# Patient Record
Sex: FEMALE | Race: BLACK OR AFRICAN AMERICAN | Employment: FULL TIME | URBAN - METROPOLITAN AREA
[De-identification: names, ages, dates, MRNs, and addresses within clinical notes are randomized per-mention and may not be internally consistent; named-entity substitution may affect disease eponyms.]

---

## 2017-01-11 PROBLEM — G47.00 INSOMNIA: Status: ACTIVE | Noted: 2017-01-11

## 2017-01-23 ENCOUNTER — HOSPITAL ENCOUNTER (OUTPATIENT)
Dept: LAB | Age: 57
Discharge: HOME | End: 2017-01-23

## 2017-01-23 PROCEDURE — 88305 TISSUE EXAM BY PATHOLOGIST: CPT | Performed by: INTERNAL MEDICINE

## 2017-08-10 ENCOUNTER — HOSPITAL ENCOUNTER (OUTPATIENT)
Dept: MAMMOGRAPHY | Age: 57
Discharge: HOME | End: 2017-08-10
Attending: NURSE PRACTITIONER
Payer: COMMERCIAL

## 2017-08-10 DIAGNOSIS — Z12.31 ENCOUNTER FOR MAMMOGRAM TO ESTABLISH BASELINE MAMMOGRAM: ICD-10-CM

## 2017-08-10 PROCEDURE — 77067 SCR MAMMO BI INCL CAD: CPT

## 2017-08-15 PROBLEM — F41.1 GENERALIZED ANXIETY DISORDER: Status: ACTIVE | Noted: 2017-08-15

## 2017-08-15 PROBLEM — F33.9 RECURRENT MAJOR DEPRESSIVE DISORDER (HCC): Status: ACTIVE | Noted: 2017-08-15

## 2017-08-29 ENCOUNTER — HOSPITAL ENCOUNTER (OUTPATIENT)
Dept: MAMMOGRAPHY | Age: 57
Discharge: HOME | End: 2017-08-29
Attending: NURSE PRACTITIONER
Payer: COMMERCIAL

## 2017-08-29 DIAGNOSIS — R92.8 ABNORMAL SCREENING MAMMOGRAM: ICD-10-CM

## 2017-08-29 PROCEDURE — 76642 ULTRASOUND BREAST LIMITED: CPT

## 2017-08-29 PROCEDURE — 77065 DX MAMMO INCL CAD UNI: CPT

## 2018-01-01 ENCOUNTER — HOSPITAL ENCOUNTER (INPATIENT)
Age: 58
LOS: 2 days | Discharge: HOME | DRG: 246 | End: 2018-01-03
Attending: EMERGENCY MEDICINE | Admitting: INTERNAL MEDICINE
Payer: COMMERCIAL

## 2018-01-01 DIAGNOSIS — I21.3 ACUTE ST ELEVATION MYOCARDIAL INFARCTION (STEMI), UNSPECIFIED ARTERY (HCC): Primary | ICD-10-CM

## 2018-01-01 PROBLEM — I21.19 ST ELEVATION MYOCARDIAL INFARCTION (STEMI) OF INFERIOR WALL (HCC): Status: ACTIVE | Noted: 2018-01-01

## 2018-01-01 PROBLEM — Z72.0 TOBACCO USE: Status: ACTIVE | Noted: 2018-01-01

## 2018-01-01 PROBLEM — I49.01 VENTRICULAR FIBRILLATION (HCC): Status: ACTIVE | Noted: 2018-01-01

## 2018-01-01 LAB
ACT BLD: 379 SECS (ref 70–128)
ALBUMIN SERPL-MCNC: 3.8 G/DL (ref 3.5–5)
ALBUMIN/GLOB SERPL: 0.8 {RATIO} (ref 1.2–3.5)
ALP SERPL-CCNC: 96 U/L (ref 50–136)
ALT SERPL-CCNC: 32 U/L (ref 12–65)
ANION GAP SERPL CALC-SCNC: 7 MMOL/L (ref 7–16)
AST SERPL-CCNC: 41 U/L (ref 15–37)
ATRIAL RATE: 62 BPM
ATRIAL RATE: 65 BPM
ATRIAL RATE: 77 BPM
BASOPHILS # BLD: 0 K/UL (ref 0–0.2)
BASOPHILS # BLD: 0.1 K/UL (ref 0–0.2)
BASOPHILS NFR BLD: 0 % (ref 0–2)
BASOPHILS NFR BLD: 1 % (ref 0–2)
BILIRUB SERPL-MCNC: 0.4 MG/DL (ref 0.2–1.1)
BUN SERPL-MCNC: 13 MG/DL (ref 6–23)
CALCIUM SERPL-MCNC: 8.9 MG/DL (ref 8.3–10.4)
CALCULATED P AXIS, ECG09: 51 DEGREES
CALCULATED P AXIS, ECG09: 52 DEGREES
CALCULATED P AXIS, ECG09: 59 DEGREES
CALCULATED R AXIS, ECG10: 0 DEGREES
CALCULATED R AXIS, ECG10: 37 DEGREES
CALCULATED R AXIS, ECG10: 68 DEGREES
CALCULATED T AXIS, ECG11: -6 DEGREES
CALCULATED T AXIS, ECG11: 110 DEGREES
CALCULATED T AXIS, ECG11: 52 DEGREES
CHLORIDE SERPL-SCNC: 106 MMOL/L (ref 98–107)
CO2 SERPL-SCNC: 29 MMOL/L (ref 21–32)
CREAT SERPL-MCNC: 0.86 MG/DL (ref 0.6–1)
DIAGNOSIS, 93000: NORMAL
DIFFERENTIAL METHOD BLD: ABNORMAL
DIFFERENTIAL METHOD BLD: ABNORMAL
EOSINOPHIL # BLD: 0 K/UL (ref 0–0.8)
EOSINOPHIL # BLD: 0.1 K/UL (ref 0–0.8)
EOSINOPHIL NFR BLD: 0 % (ref 0.5–7.8)
EOSINOPHIL NFR BLD: 1 % (ref 0.5–7.8)
ERYTHROCYTE [DISTWIDTH] IN BLOOD BY AUTOMATED COUNT: 14.2 % (ref 11.9–14.6)
ERYTHROCYTE [DISTWIDTH] IN BLOOD BY AUTOMATED COUNT: 14.4 % (ref 11.9–14.6)
ERYTHROCYTE [DISTWIDTH] IN BLOOD BY AUTOMATED COUNT: 14.5 % (ref 11.9–14.6)
GLOBULIN SER CALC-MCNC: 5 G/DL (ref 2.3–3.5)
GLUCOSE SERPL-MCNC: 151 MG/DL (ref 65–100)
HCT VFR BLD AUTO: 34.4 % (ref 35.8–46.3)
HCT VFR BLD AUTO: 36.7 % (ref 35.8–46.3)
HCT VFR BLD AUTO: 42 % (ref 35.8–46.3)
HGB BLD-MCNC: 11 G/DL (ref 11.7–15.4)
HGB BLD-MCNC: 11.9 G/DL (ref 11.7–15.4)
HGB BLD-MCNC: 13.7 G/DL (ref 11.7–15.4)
IMM GRANULOCYTES # BLD: 0 K/UL (ref 0–0.5)
IMM GRANULOCYTES # BLD: 0 K/UL (ref 0–0.5)
IMM GRANULOCYTES NFR BLD AUTO: 0 % (ref 0–5)
IMM GRANULOCYTES NFR BLD AUTO: 0 % (ref 0–5)
LYMPHOCYTES # BLD: 2.7 K/UL (ref 0.5–4.6)
LYMPHOCYTES # BLD: 3.4 K/UL (ref 0.5–4.6)
LYMPHOCYTES NFR BLD: 32 % (ref 13–44)
LYMPHOCYTES NFR BLD: 44 % (ref 13–44)
MCH RBC QN AUTO: 27.6 PG (ref 26.1–32.9)
MCH RBC QN AUTO: 27.6 PG (ref 26.1–32.9)
MCH RBC QN AUTO: 28.1 PG (ref 26.1–32.9)
MCHC RBC AUTO-ENTMCNC: 32 G/DL (ref 31.4–35)
MCHC RBC AUTO-ENTMCNC: 32.4 G/DL (ref 31.4–35)
MCHC RBC AUTO-ENTMCNC: 32.6 G/DL (ref 31.4–35)
MCV RBC AUTO: 85.2 FL (ref 79.6–97.8)
MCV RBC AUTO: 86.2 FL (ref 79.6–97.8)
MCV RBC AUTO: 86.2 FL (ref 79.6–97.8)
MONOCYTES # BLD: 0.6 K/UL (ref 0.1–1.3)
MONOCYTES # BLD: 0.8 K/UL (ref 0.1–1.3)
MONOCYTES NFR BLD: 7 % (ref 4–12)
MONOCYTES NFR BLD: 9 % (ref 4–12)
NEUTS SEG # BLD: 3.7 K/UL (ref 1.7–8.2)
NEUTS SEG # BLD: 4.9 K/UL (ref 1.7–8.2)
NEUTS SEG NFR BLD: 47 % (ref 43–78)
NEUTS SEG NFR BLD: 59 % (ref 43–78)
P-R INTERVAL, ECG05: 144 MS
P-R INTERVAL, ECG05: 154 MS
P-R INTERVAL, ECG05: 176 MS
PLATELET # BLD AUTO: 297 K/UL (ref 150–450)
PLATELET # BLD AUTO: 311 K/UL (ref 150–450)
PLATELET # BLD AUTO: 357 K/UL (ref 150–450)
PMV BLD AUTO: 10.5 FL (ref 10.8–14.1)
PMV BLD AUTO: 9.6 FL (ref 10.8–14.1)
PMV BLD AUTO: 9.7 FL (ref 10.8–14.1)
POTASSIUM SERPL-SCNC: 4.4 MMOL/L (ref 3.5–5.1)
PROT SERPL-MCNC: 8.8 G/DL (ref 6.3–8.2)
Q-T INTERVAL, ECG07: 408 MS
Q-T INTERVAL, ECG07: 418 MS
Q-T INTERVAL, ECG07: 450 MS
QRS DURATION, ECG06: 82 MS
QRS DURATION, ECG06: 86 MS
QRS DURATION, ECG06: 90 MS
QTC CALCULATION (BEZET), ECG08: 434 MS
QTC CALCULATION (BEZET), ECG08: 456 MS
QTC CALCULATION (BEZET), ECG08: 461 MS
RBC # BLD AUTO: 3.99 M/UL (ref 4.05–5.25)
RBC # BLD AUTO: 4.31 M/UL (ref 4.05–5.25)
RBC # BLD AUTO: 4.87 M/UL (ref 4.05–5.25)
SODIUM SERPL-SCNC: 142 MMOL/L (ref 136–145)
TROPONIN I BLD-MCNC: 0 NG/ML (ref 0.02–0.05)
TROPONIN I SERPL-MCNC: 0.02 NG/ML (ref 0.02–0.05)
VENTRICULAR RATE, ECG03: 62 BPM
VENTRICULAR RATE, ECG03: 65 BPM
VENTRICULAR RATE, ECG03: 77 BPM
WBC # BLD AUTO: 7.3 K/UL (ref 4.3–11.1)
WBC # BLD AUTO: 7.8 K/UL (ref 4.3–11.1)
WBC # BLD AUTO: 8.5 K/UL (ref 4.3–11.1)

## 2018-01-01 PROCEDURE — 85025 COMPLETE CBC W/AUTO DIFF WBC: CPT | Performed by: EMERGENCY MEDICINE

## 2018-01-01 PROCEDURE — 74011250637 HC RX REV CODE- 250/637: Performed by: PHYSICIAN ASSISTANT

## 2018-01-01 PROCEDURE — C1874 STENT, COATED/COV W/DEL SYS: HCPCS

## 2018-01-01 PROCEDURE — C1769 GUIDE WIRE: HCPCS

## 2018-01-01 PROCEDURE — 74011250636 HC RX REV CODE- 250/636: Performed by: PHYSICIAN ASSISTANT

## 2018-01-01 PROCEDURE — 5A2204Z RESTORATION OF CARDIAC RHYTHM, SINGLE: ICD-10-PCS | Performed by: INTERNAL MEDICINE

## 2018-01-01 PROCEDURE — B2151ZZ FLUOROSCOPY OF LEFT HEART USING LOW OSMOLAR CONTRAST: ICD-10-PCS | Performed by: INTERNAL MEDICINE

## 2018-01-01 PROCEDURE — 85347 COAGULATION TIME ACTIVATED: CPT

## 2018-01-01 PROCEDURE — 93005 ELECTROCARDIOGRAM TRACING: CPT | Performed by: EMERGENCY MEDICINE

## 2018-01-01 PROCEDURE — 84484 ASSAY OF TROPONIN QUANT: CPT | Performed by: EMERGENCY MEDICINE

## 2018-01-01 PROCEDURE — 85027 COMPLETE CBC AUTOMATED: CPT | Performed by: PHYSICIAN ASSISTANT

## 2018-01-01 PROCEDURE — 027034Z DILATION OF CORONARY ARTERY, ONE ARTERY WITH DRUG-ELUTING INTRALUMINAL DEVICE, PERCUTANEOUS APPROACH: ICD-10-PCS | Performed by: INTERNAL MEDICINE

## 2018-01-01 PROCEDURE — C1887 CATHETER, GUIDING: HCPCS

## 2018-01-01 PROCEDURE — 93005 ELECTROCARDIOGRAM TRACING: CPT | Performed by: INTERNAL MEDICINE

## 2018-01-01 PROCEDURE — 74011250636 HC RX REV CODE- 250/636: Performed by: INTERNAL MEDICINE

## 2018-01-01 PROCEDURE — B2111ZZ FLUOROSCOPY OF MULTIPLE CORONARY ARTERIES USING LOW OSMOLAR CONTRAST: ICD-10-PCS | Performed by: INTERNAL MEDICINE

## 2018-01-01 PROCEDURE — 36415 COLL VENOUS BLD VENIPUNCTURE: CPT | Performed by: PHYSICIAN ASSISTANT

## 2018-01-01 PROCEDURE — 77030019569 HC BND COMPR RAD TERU -B

## 2018-01-01 PROCEDURE — 74011636320 HC RX REV CODE- 636/320: Performed by: INTERNAL MEDICINE

## 2018-01-01 PROCEDURE — 74011250636 HC RX REV CODE- 250/636

## 2018-01-01 PROCEDURE — 80053 COMPREHEN METABOLIC PANEL: CPT | Performed by: EMERGENCY MEDICINE

## 2018-01-01 PROCEDURE — 92941 PRQ TRLML REVSC TOT OCCL AMI: CPT

## 2018-01-01 PROCEDURE — 74011000250 HC RX REV CODE- 250: Performed by: INTERNAL MEDICINE

## 2018-01-01 PROCEDURE — 93458 L HRT ARTERY/VENTRICLE ANGIO: CPT

## 2018-01-01 PROCEDURE — C1894 INTRO/SHEATH, NON-LASER: HCPCS

## 2018-01-01 PROCEDURE — 99285 EMERGENCY DEPT VISIT HI MDM: CPT | Performed by: EMERGENCY MEDICINE

## 2018-01-01 PROCEDURE — 99152 MOD SED SAME PHYS/QHP 5/>YRS: CPT

## 2018-01-01 PROCEDURE — 74011250637 HC RX REV CODE- 250/637: Performed by: INTERNAL MEDICINE

## 2018-01-01 PROCEDURE — 4A023N7 MEASUREMENT OF CARDIAC SAMPLING AND PRESSURE, LEFT HEART, PERCUTANEOUS APPROACH: ICD-10-PCS | Performed by: INTERNAL MEDICINE

## 2018-01-01 PROCEDURE — C1725 CATH, TRANSLUMIN NON-LASER: HCPCS

## 2018-01-01 PROCEDURE — 77030004534 HC CATH ANGI DX INFN CARD -A

## 2018-01-01 PROCEDURE — 99153 MOD SED SAME PHYS/QHP EA: CPT

## 2018-01-01 PROCEDURE — 65610000001 HC ROOM ICU GENERAL

## 2018-01-01 RX ORDER — EPTIFIBATIDE 2 MG/ML
180 INJECTION, SOLUTION INTRAVENOUS
Status: DISCONTINUED | OUTPATIENT
Start: 2018-01-01 | End: 2018-01-01

## 2018-01-01 RX ORDER — SODIUM CHLORIDE 0.9 % (FLUSH) 0.9 %
5-10 SYRINGE (ML) INJECTION AS NEEDED
Status: DISCONTINUED | OUTPATIENT
Start: 2018-01-01 | End: 2018-01-03 | Stop reason: HOSPADM

## 2018-01-01 RX ORDER — MORPHINE SULFATE 2 MG/ML
2 INJECTION, SOLUTION INTRAMUSCULAR; INTRAVENOUS ONCE
Status: COMPLETED | OUTPATIENT
Start: 2018-01-01 | End: 2018-01-01

## 2018-01-01 RX ORDER — HEPARIN SODIUM 200 [USP'U]/100ML
3 INJECTION, SOLUTION INTRAVENOUS CONTINUOUS
Status: DISCONTINUED | OUTPATIENT
Start: 2018-01-01 | End: 2018-01-01

## 2018-01-01 RX ORDER — MIDAZOLAM HYDROCHLORIDE 1 MG/ML
.5-5 INJECTION, SOLUTION INTRAMUSCULAR; INTRAVENOUS
Status: DISCONTINUED | OUTPATIENT
Start: 2018-01-01 | End: 2018-01-01

## 2018-01-01 RX ORDER — GUAIFENESIN 100 MG/5ML
81 LIQUID (ML) ORAL DAILY
Status: DISCONTINUED | OUTPATIENT
Start: 2018-01-01 | End: 2018-01-03 | Stop reason: HOSPADM

## 2018-01-01 RX ORDER — SODIUM CHLORIDE 0.9 % (FLUSH) 0.9 %
5-10 SYRINGE (ML) INJECTION EVERY 8 HOURS
Status: DISCONTINUED | OUTPATIENT
Start: 2018-01-01 | End: 2018-01-03 | Stop reason: HOSPADM

## 2018-01-01 RX ORDER — NITROGLYCERIN 0.4 MG/1
0.4 TABLET SUBLINGUAL
Status: DISCONTINUED | OUTPATIENT
Start: 2018-01-01 | End: 2018-01-01 | Stop reason: SDUPTHER

## 2018-01-01 RX ORDER — TRAZODONE HYDROCHLORIDE 50 MG/1
100 TABLET ORAL
Status: DISCONTINUED | OUTPATIENT
Start: 2018-01-01 | End: 2018-01-03 | Stop reason: HOSPADM

## 2018-01-01 RX ORDER — ATROPINE SULFATE 0.1 MG/ML
1 INJECTION INTRAVENOUS ONCE
Status: COMPLETED | OUTPATIENT
Start: 2018-01-01 | End: 2018-01-01

## 2018-01-01 RX ORDER — ACETAMINOPHEN 325 MG/1
650 TABLET ORAL
Status: DISCONTINUED | OUTPATIENT
Start: 2018-01-01 | End: 2018-01-03 | Stop reason: HOSPADM

## 2018-01-01 RX ORDER — ATORVASTATIN CALCIUM 40 MG/1
80 TABLET, FILM COATED ORAL
Status: DISCONTINUED | OUTPATIENT
Start: 2018-01-01 | End: 2018-01-03 | Stop reason: HOSPADM

## 2018-01-01 RX ORDER — ASPIRIN 81 MG/1
81 TABLET ORAL DAILY
Status: DISCONTINUED | OUTPATIENT
Start: 2018-01-01 | End: 2018-01-01 | Stop reason: SDUPTHER

## 2018-01-01 RX ORDER — EPTIFIBATIDE 0.75 MG/ML
2 INJECTION, SOLUTION INTRAVENOUS CONTINUOUS
Status: DISCONTINUED | OUTPATIENT
Start: 2018-01-01 | End: 2018-01-01

## 2018-01-01 RX ORDER — LIDOCAINE HYDROCHLORIDE 20 MG/ML
1-20 INJECTION, SOLUTION INFILTRATION; PERINEURAL
Status: DISCONTINUED | OUTPATIENT
Start: 2018-01-01 | End: 2018-01-01

## 2018-01-01 RX ORDER — METOPROLOL TARTRATE 25 MG/1
25 TABLET, FILM COATED ORAL EVERY 12 HOURS
Status: DISCONTINUED | OUTPATIENT
Start: 2018-01-01 | End: 2018-01-03

## 2018-01-01 RX ORDER — SERTRALINE HYDROCHLORIDE 100 MG/1
100 TABLET, FILM COATED ORAL 2 TIMES DAILY
Status: DISCONTINUED | OUTPATIENT
Start: 2018-01-01 | End: 2018-01-03 | Stop reason: HOSPADM

## 2018-01-01 RX ORDER — SODIUM CHLORIDE 9 MG/ML
75 INJECTION, SOLUTION INTRAVENOUS CONTINUOUS
Status: DISCONTINUED | OUTPATIENT
Start: 2018-01-01 | End: 2018-01-02

## 2018-01-01 RX ORDER — NITROGLYCERIN 0.4 MG/1
0.4 TABLET SUBLINGUAL
Status: DISCONTINUED | OUTPATIENT
Start: 2018-01-01 | End: 2018-01-03 | Stop reason: HOSPADM

## 2018-01-01 RX ORDER — EPTIFIBATIDE 0.75 MG/ML
2 INJECTION, SOLUTION INTRAVENOUS CONTINUOUS
Status: DISPENSED | OUTPATIENT
Start: 2018-01-01 | End: 2018-01-01

## 2018-01-01 RX ORDER — FENTANYL CITRATE 50 UG/ML
25-100 INJECTION, SOLUTION INTRAMUSCULAR; INTRAVENOUS
Status: DISCONTINUED | OUTPATIENT
Start: 2018-01-01 | End: 2018-01-01

## 2018-01-01 RX ORDER — HEPARIN SODIUM 10000 [USP'U]/ML
40-80 INJECTION, SOLUTION INTRAVENOUS; SUBCUTANEOUS
Status: DISCONTINUED | OUTPATIENT
Start: 2018-01-01 | End: 2018-01-01

## 2018-01-01 RX ORDER — CLOPIDOGREL BISULFATE 75 MG/1
75 TABLET ORAL DAILY
Status: DISCONTINUED | OUTPATIENT
Start: 2018-01-02 | End: 2018-01-01 | Stop reason: ALTCHOICE

## 2018-01-01 RX ADMIN — Medication 10 ML: at 22:05

## 2018-01-01 RX ADMIN — TICAGRELOR 180 MG: 90 TABLET ORAL at 09:43

## 2018-01-01 RX ADMIN — MORPHINE SULFATE 2 MG: 2 INJECTION, SOLUTION INTRAMUSCULAR; INTRAVENOUS at 14:45

## 2018-01-01 RX ADMIN — METOPROLOL TARTRATE 25 MG: 25 TABLET ORAL at 22:04

## 2018-01-01 RX ADMIN — SERTRALINE HYDROCHLORIDE 100 MG: 100 TABLET ORAL at 12:45

## 2018-01-01 RX ADMIN — EPTIFIBATIDE 2 MCG/KG/MIN: 0.75 INJECTION, SOLUTION INTRAVENOUS at 09:38

## 2018-01-01 RX ADMIN — Medication 10 ML: at 13:13

## 2018-01-01 RX ADMIN — ASPIRIN 81 MG 81 MG: 81 TABLET ORAL at 11:18

## 2018-01-01 RX ADMIN — TICAGRELOR 90 MG: 90 TABLET ORAL at 22:04

## 2018-01-01 RX ADMIN — SERTRALINE HYDROCHLORIDE 100 MG: 100 TABLET ORAL at 17:04

## 2018-01-01 RX ADMIN — EPTIFIBATIDE 2 MCG/KG/MIN: 0.75 INJECTION, SOLUTION INTRAVENOUS at 12:28

## 2018-01-01 RX ADMIN — ATROPINE SULFATE 0.5 MG: 0.1 INJECTION, SOLUTION INTRAVENOUS at 09:27

## 2018-01-01 RX ADMIN — MORPHINE SULFATE 2 MG: 2 INJECTION, SOLUTION INTRAMUSCULAR; INTRAVENOUS at 17:04

## 2018-01-01 RX ADMIN — SODIUM CHLORIDE 75 ML/HR: 900 INJECTION, SOLUTION INTRAVENOUS at 11:04

## 2018-01-01 RX ADMIN — TRAZODONE HYDROCHLORIDE 100 MG: 50 TABLET ORAL at 22:04

## 2018-01-01 RX ADMIN — METOPROLOL TARTRATE 25 MG: 25 TABLET ORAL at 11:18

## 2018-01-01 RX ADMIN — NITROGLYCERIN 1 INCH: 20 OINTMENT TOPICAL at 15:30

## 2018-01-01 RX ADMIN — ACETAMINOPHEN 650 MG: 325 TABLET ORAL at 20:28

## 2018-01-01 RX ADMIN — EPTIFIBATIDE 2 MCG/KG/MIN: 0.75 INJECTION, SOLUTION INTRAVENOUS at 09:28

## 2018-01-01 RX ADMIN — IOPAMIDOL 85 ML: 755 INJECTION, SOLUTION INTRAVENOUS at 09:49

## 2018-01-01 RX ADMIN — LIDOCAINE HYDROCHLORIDE 60 MG: 20 INJECTION, SOLUTION INFILTRATION; PERINEURAL at 09:15

## 2018-01-01 RX ADMIN — HEPARIN SODIUM 3 ML/HR: 200 INJECTION, SOLUTION INTRAVENOUS at 09:49

## 2018-01-01 RX ADMIN — FENTANYL CITRATE 25 MCG: 50 INJECTION, SOLUTION INTRAMUSCULAR; INTRAVENOUS at 09:16

## 2018-01-01 RX ADMIN — SODIUM CHLORIDE 75 ML/HR: 900 INJECTION, SOLUTION INTRAVENOUS at 22:04

## 2018-01-01 RX ADMIN — HEPARIN SODIUM 4000 UNITS: 10000 INJECTION, SOLUTION INTRAVENOUS; SUBCUTANEOUS at 09:21

## 2018-01-01 RX ADMIN — NITROGLYCERIN 0.4 MG: 0.4 TABLET SUBLINGUAL at 14:30

## 2018-01-01 RX ADMIN — EPTIFIBATIDE 2 MCG/KG/MIN: 0.75 INJECTION, SOLUTION INTRAVENOUS at 09:30

## 2018-01-01 RX ADMIN — EPTIFIBATIDE 2 MCG/KG/MIN: 0.75 INJECTION, SOLUTION INTRAVENOUS at 19:17

## 2018-01-01 RX ADMIN — HEPARIN SODIUM 2 ML: 10000 INJECTION, SOLUTION INTRAVENOUS; SUBCUTANEOUS at 09:15

## 2018-01-01 RX ADMIN — MIDAZOLAM HYDROCHLORIDE 2 MG: 1 INJECTION, SOLUTION INTRAMUSCULAR; INTRAVENOUS at 09:16

## 2018-01-01 RX ADMIN — ATORVASTATIN CALCIUM 80 MG: 40 TABLET, FILM COATED ORAL at 22:04

## 2018-01-01 NOTE — PROGRESS NOTES
Patient complains of chest pain 10/10 similar to pain prior. 1 palak tab given pain now 5/10. MD Perera notified. Orders received to give 2 mg Morphine IV, 1 in nitro paste, stat EKG.  Will monitor

## 2018-01-01 NOTE — PROGRESS NOTES
TRANSFER - IN REPORT:    Verbal report received from [place], [place] on [facility]  being received from Cath lab for routine progression of care      Report consisted of patients Situation, Background, Assessment and   Recommendations(SBAR). Information from the following report(s) SBAR, Procedure Summary, MAR, Med Rec Status and Cardiac Rhythm SR was reviewed with the receiving nurse. Opportunity for questions and clarification was provided. Assessment completed upon patients arrival to unit and care assumed. Dual skin assessment completed with Al Stewart RN findings were Skin color, texture, turgor normal. No rashes or lesions. R radial TR band in place. No bruising or hematoma, site CDI. Pt AAO x 4 on 2 lpm NC. NSR in 80-90's, neg CP at this time. Integalin gtt at 2 mcg/kg/min infusing.

## 2018-01-01 NOTE — PROGRESS NOTES
TRANSFER - OUT REPORT:    Verbal report given to Danitza Barron RN(name) on [facility]  being transferred to ICU(unit) for routine progression of care       Report consisted of patients Situation, Background, Assessment and   Recommendations(SBAR). Information from the following report(s) SBAR was reviewed with the receiving nurse. is allergic to bactrim [sulfamethoprim ds]; codeine; and sulfa (sulfonamide antibiotics). Opportunity for questions and clarification was provided. Procedure Summary:Pt had LHC for stemi, 1 stent placed in RCA via R wrist. Site sealed with R band using 12 ml at 0938 hrs. Med Administration    Versed:  2 mg  Fentanyl: 25 mcg  Heparin: 4000 units  Integrilin: bolus x2 and gtt.     Visit Vitals    /81    Pulse 97    Temp 97 °F (36.1 °C)    Resp 18    Ht 5' 4\" (1.626 m)    Wt 91.2 kg (201 lb)    SpO2 99%    BMI 34.5 kg/m2     Past Medical History:   Diagnosis Date    Atherosclerosis of native coronary artery of native heart without angina pectoris 4/21/2016    Depression     GERD (gastroesophageal reflux disease)     Hypercholesterolemia     Hypertension     Sleep apnea            Peripheral IV 01/01/18 Right Antecubital (Active)   Site Assessment Clean, dry, & intact 1/1/2018  9:01 AM   Phlebitis Assessment 0 1/1/2018  9:01 AM   Infiltration Assessment 0 1/1/2018  9:01 AM   Dressing Status Clean, dry, & intact 1/1/2018  9:01 AM

## 2018-01-01 NOTE — PROGRESS NOTES
CTSP bc of recurrent CP, under L breast, non radiating, without SOB, nausea or diaphoresis, 10/10- improved to 5/10 after nitro, then 2/10 with morphine 2 mg. EKG w diffuse t wave inversion but no ST elevation (PCI to RCA). Patient Vitals for the past 12 hrs:   Temp Pulse Resp BP SpO2   01/01/18 1430 - 66 - 105/71 -   01/01/18 1134 - 75 13 116/78 97 %   01/01/18 1118 - 72 27 123/69 98 %   01/01/18 1048 - 90 (!) 39 128/76 98 %   01/01/18 1026 97.4 °F (36.3 °C) 88 13 118/69 97 %   01/01/18 1006 - 97 18 123/81 -   01/01/18 0955 - 95 16 110/77 99 %   01/01/18 0857 - 66 12 - 99 %   01/01/18 0856 - 63 18 - 99 %   01/01/18 0853 - 64 14 - 99 %   01/01/18 0852 - 65 15 - 99 %   01/01/18 0851 - 65 12 - 99 %   01/01/18 0847 97 °F (36.1 °C) 71 12 135/72 98 %   01/01/18 0846 - - - 135/72 -   GEN: No distress  CV- RRR, no ectopy  Pul- CTA, chest wall not tender with palpation   Abdomen - NT  Musc- no edema  Integ- new petechial patch R clavicular about 10cm x 8 cm with two purpura in R chest- not tender, not blanchable    A/P 1.  STEMI- s/p PCI RCA, recurrent CP-EKG without acute ischemic changes, ekg w diffuse t wave inversion but no ST elevation (inferior st elevation resolved)- cont ASA, brilinta, Integrilin, statin and lopressor, no ACE/ARB due to hypotension. Will give additional morphine 2 mg and monitor.    2. Petechial rash- check stat CBC    IZZY Mcfarland

## 2018-01-01 NOTE — PROCEDURES
[address]    [facility]  MR#: [id number]  : 1960  ACCOUNT #: [de-identified]   DATE OF SERVICE: 2018    PROCEDURES:  Left heart catheterization, selective coronary arteriography, left ventriculogram via the right radial approach. INDICATIONS:  Ongoing chest discomfort with inferior ST elevation myocardial infarction, STEMI protocol activated. TECHNICAL FACTORS:  After informed consent was obtained, the patient was brought to the cardiac catheterization lab. Right radial artery was prepped and draped in usual sterile fashion. Utilizing modified Seldinger technique and micropuncture needle, the right radial artery was entered. A 6-Citizen of [place] Terumo slender sheath was placed without difficulty. A radial cocktail consisting of 2000 units of heparin, 2 mg of verapamil and 200 mcg of nitroglycerin was administered. A 5-Citizen of [place] Tiger 4.0 catheter was used to selectively engage the ostium of the right coronary artery and left main coronary artery respectively. Selective injection in various projections was performed. At this point, the patient underwent PCI of the RCA. Please see PCI note as below. Following PCI, a pigtail catheter was used to cross the aortic valve and then the left ventricle. Hemodynamic measurements and left ventriculogram were obtained. Left ventricular aortic pressure gradient was obtained by pullback technique. CONTRAST:  Isovue 85. SEDATION:  The patient received 2 mg of Versed and 25 mcg of fentanyl for moderate supervised conscious sedation. Sedation was administered by Ray Chapin RN, under my supervision. SEDATION START TIME:  9:17      PROCEDURE COMPLETION TIME:  9:45      HEMODYNAMICS:  1. The aortic pressure was 107/75 with a mean of 85.  2.  Left ventricular end diastolic pressure was 23.  3.  There was no significant gradient across the aortic valve. ANGIOGRAPHIC RESULTS:    1.   Left main coronary artery is a large-caliber vessel, angiographically-normal.  2.  LAD is a medium-caliber vessel, 20% proximal and 20% mid stenosis. 3.  First diagonal Artery is a medium-caliber vessel, 20% to 30% luminal irregularities proximally. 4.  Left circumflex is a medium-caliber, codominant vessel, contains 20% proximal and 30% mid stenosis. 5.  First obtuse marginal artery is a medium-caliber vessel, appeared patent. 6.  Second obtuse marginal artery is a medium-caliber vessel, appeared patent. 7.  Right coronary artery is a medium-caliber, codominant vessel, 30% proximal stenosis, 100% mid occlusion. LEFT VENTRICULOGRAM:  Performed in the ALBRIGHT projection shows EF is 65% with hyperdynamic function of the anterior wall and apex. There is mid inferior akinesis. Aortic root is not dilated. No mitral regurgitation is seen. CONCLUSIONS:    1.  Obstructive 1-vessel coronary artery disease involving the mid RCA. This is the culprit of her active myocardial infarction. 2.  Normal LV systolic function with inferior wall motion abnormalities consistent with ischemic cardiomyopathy. PLAN:  Proceed with PCI of the RCA. PCI NOTE:      LESION:  Mid RCA. PRE-STENOSIS:  100% with MAGALIS-0 flow. POST-STENOSIS:  0% with MAGALIS-3 flow. TECHNICAL FACTORS:  A JR4 guide was used to selectively engage the ostium of the right coronary artery. The patient received an additional 4000 units of heparin. ACT was 317. A Status Overload wire was advanced across the occlusion. With recanalization of the artery, the patient developed ventricular fibrillation. She required cardioversion with 200 joules of unsynchronized energy with restoration of sinus rhythm. At this point, she was markedly bradycardic with a heart rate in the 30s and 40s and 0.5 mg of atropine was given with stabilization of her rhythm. At this point, a Trek 2.5 x 15 mm balloon was positioned, deployed to 6 atmospheres for 14 seconds.   With this, there was restoration of MAGALIS-3 flow. Integrilin was administered via standard fashion. A Synergy 3.0 x 28 mm drug-eluting stent was positioned, deployed to 14 atmospheres. Post-deployment, 3.25 x 15 mm NC Trek balloon was positioned, deployed on 2 separate occasions at 18 atmospheres. Following post-dilatation, there was excellent angiographic result and no residual stenosis. The wire was removed and final projections were obtained. The sheath was removed and a pneumatic band was placed. The patient will be given 180 mg of Brilinta. CONCLUSIONS:    1. Successful PCI and stenting of the mid RCA with a 3.0 x 28 mm Synergy drug-eluting stent. 2.  Successful cardioversion of ventricular fibrillation, which occurred with recanalization of an occluded RCA. PLAN:  Monitor patient closely in the post-procedural setting.       MD ELIEL Grove / RELL  D: 01/01/2018 10:00     T: 01/01/2018 10:50  JOB #: [id number]

## 2018-01-01 NOTE — H&P
[facility] CARDIOLOGY History & Physical                   Subjective:     Patient with known mild to moderate nonobstructive coronary artery disease based on catheterization in 2016 presents with ongoing chest pain. She activated EMS due to substernal chest pressure. No radiation. Mild diaphoresis and dyspnea. EMS noted inferior ST elevation with reciprocal ST depression in the lateral leads. She currently rates her chest pain a 10 out of 10. No cardiac arrhythmias. She has received heparin bolus and aspirin. She has hypertension and tobacco use. Past Medical History:   Diagnosis Date    Atherosclerosis of native coronary artery of native heart without angina pectoris 2016    Depression     GERD (gastroesophageal reflux disease)     Hypercholesterolemia     Hypertension     Sleep apnea       Past Surgical History:   Procedure Laterality Date    HX BREAST BIOPSY      right biopsy    HX  SECTION      HX  SECTION      HX HYSTERECTOMY      HX TUBAL LIGATION        Allergies   Allergen Reactions    Bactrim [Sulfamethoprim Ds] Rash    Codeine Itching    Sulfa (Sulfonamide Antibiotics) Hives     Social History   Substance Use Topics    Smoking status: Former Smoker     Packs/day: 0.25     Types: Cigarettes     Quit date: 2015    Smokeless tobacco: Never Used    Alcohol use No      FH:   Family History   Problem Relation Age of Onset    Hypertension Mother     Thyroid Disease Mother     No Known Problems Father     Breast Cancer Neg Hx         Review of Systems   Constitutional: Negative for chills and fever. HENT: Negative for tinnitus. Eyes: Negative for blurred vision. Respiratory: Positive for shortness of breath. Negative for cough. Cardiovascular: Positive for chest pain. Negative for orthopnea. Gastrointestinal: Negative for abdominal pain and nausea. Genitourinary: Negative for dysuria.    Musculoskeletal: Negative for joint pain.   Skin: Negative for rash. Neurological: Negative for tremors, sensory change and headaches. Endo/Heme/Allergies: Does not bruise/bleed easily. Psychiatric/Behavioral: Negative for depression. The patient is nervous/anxious. Objective:       Visit Vitals    /72 (BP 1 Location: Right arm, BP Patient Position: At rest)    Pulse 66    Temp 97 °F (36.1 °C)    Resp 12    Ht 5' 4\" (1.626 m)    Wt 91.2 kg (201 lb)    SpO2 99%    BMI 34.5 kg/m2               Physical Exam   Constitutional: She is oriented to person, place, and time and well-developed, well-nourished, and in no distress. HENT:   Head: Atraumatic. Eyes: Conjunctivae are normal. Pupils are equal, round, and reactive to light. Neck: No JVD present. No thyromegaly present. Cardiovascular: Normal rate and regular rhythm. No murmur heard. Pulmonary/Chest: Effort normal and breath sounds normal.   Abdominal: Soft. Bowel sounds are normal. She exhibits no distension. There is no tenderness. Musculoskeletal: She exhibits no edema. Neurological: She is alert and oriented to person, place, and time. Skin: Skin is warm. No rash noted. Psychiatric: Mood normal.         ECG: Sinus rhythm. Inferior ST elevation with reciprocal. ST depression in the lateral leads. Data Review:   Labs: No results for input(s): NA, K, MG, BUN, CREA, GLU, WBC, HGB, HCT, PLT, INR, TRIGL, LDL, HDL, HGBEXT, HCTEXT, PLTEXT in the last 72 hours. No lab exists for component: TROPI, TCHOL, INREXT   No results found for: TROIQ, BIBI, TROPT, TNIPOC        Assessment/Plan:   Active Problems:    Hypertension ()  Continue Toprol. Hold HCTZ in the setting of cardiac catheterization. Likely start ACE inhibitor. Monitor blood pressure and adjust regimen accordingly. Hypercholesterolemia ()  Stop simvastatin and start Lipitor 80 mg by mouth daily at bedtime.       ST elevation myocardial infarction (STEMI) of inferior wall ([facility] [address] (1/1/2018)  Emergent cardiac catheterization. STEMI protocol has been activated. She has received heparin. Further anticoagulation based on anatomy. Tobacco use (1/1/2018)  Smoking cessation will be reinforced during her hospitalization.           JOSELYN Graf  1/1/2018  9:04 AM

## 2018-01-01 NOTE — CONSULTS
[facility] EVALUATION -- [facility]    The Patient is current in the ICU for: STEMI s/p  PCI of mid RCA with 3.0 x 28 mm Synergy. Vfib with crossing lesion with wire. 200 J with successful restoration of sinus rhythm. He is being managed by:  Cardiology  No need for any additional acute ICU interventions. Patient is awake and alert. Currently hemodynamically stable. Please Call if Needed. No Charge. Rajesh De La Paz MD    Electronically signed.

## 2018-01-01 NOTE — PROGRESS NOTES
Patient came into ER with stemi and taken to the Cath Lab, treated and transferred to ICU.  provided support to family, assistance and prayer.       [employer]

## 2018-01-01 NOTE — IP AVS SNAPSHOT
Romana Halo 
 
 
 [facility] [address] [facility] 
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 
[phone] [facility]  We will send your referral to [facility] ([phone]) [address] Aki Lechuga [place] [address] [phone] Your Scheduled Appointments Wednesday January 10, 2018 11:45 AM EST TRANSITIONAL CARE MANAGEMENT with Romana Halo, MD  
[facility] ([address] [place] [address]) [address]  
[address] [facility] 81  
[phone] 2018 10:30 AM EST  
SHORT with Brooks Carrero NP  
[address] [place] [address] [facility]  
[phone] 2018  9:30 AM EST  
SHORT with Nestor Luis NP  
[address]) [address] [place] [address]  
[phone] Discharge Orders Procedure Order Date Status Priority Quantity Spec Type Associated Dx REFERRAL TO CARDIAC [facility] 18 0726 Normal Routine 1 A check betsy indicates which time of day the medication should be taken. My Medications START taking these medications Instructions Each Dose to Equal  
 Morning Noon Evening Bedtime  
 atorvastatin 80 mg tablet Commonly known as:  LIPITOR Your next dose is: Tonight Notes to Patient:  New MEDICATION Take 1 Tab by mouth nightly. 80 mg  
    
   
   
   
  
  
 lisinopril-hydroCHLOROthiazide 10-12.5 mg per tablet Commonly known as:  Orin Common Your last dose was:  01/3/2018 Morning Your next dose is:  01/4/2018 Morning Notes to Patient:  New Medication Take 1 Tab by mouth daily. 1 Tab  
    
  
   
   
   
  
 ticagrelor 90 mg tablet Commonly known as:  Aminah [employer] Your last dose was:  01/3/2018 Morning Your next dose is:  01/3/2018 Evening Notes to Patient: If you encounter problems with this medications, either side effects or affordability, call [address] Cardiology immediately. New Medication Take 1 Tab by mouth every twelve (12) hours every twelve (12) hours. 90 mg CONTINUE taking these medications Instructions Each Dose to Equal  
 Morning Noon Evening Bedtime  
 aspirin delayed-release 81 mg tablet Your last dose was:  01/3/2018 Morning Your next dose is:  01/4/2018 Morning Take 81 mg by mouth daily. 81 mg Comp Stocking,Knee,Regular,Sml Misc Commonly known as:  T.E.D. ANTI-EMBOLISM STOCKING Notes to Patient:  As you wear at home continue 1 Package by Does Not Apply route daily. 1 Package  
    
   
   
   
  
 metoprolol succinate 50 mg XL tablet Commonly known as:  TOPROL-XL Your last dose was:  01/3/2018 Morning Your next dose is:  01/4/2018 Morning Take 1 Tab by mouth daily. 50 mg  
    
  
   
   
   
  
 nitroglycerin 0.4 mg SL tablet Commonly known as:  NITROSTAT Notes to Patient:  AS NEEDED for chest pain. 1 Tab by SubLINGual route every five (5) minutes as needed for Chest Pain. Indications: ANGINA  
 0.4 mg  
    
   
   
   
  
 sertraline 100 mg tablet Commonly known as:  ZOLOFT Your last dose was:  01/3/2018 Morning Your next dose is:  01/3/2018 Afternoon Take 1 Tab by mouth two (2) times a day. 100 mg  
    
   
   
  
   
  
 traZODone 100 mg tablet Commonly known as:  Kimberli Navarreteiner Your next dose is:  01/3/2018 Night Take 1 Tab by mouth nightly. 100 mg  
    
   
   
   
  
  
  
STOP taking these medications   
 hydroCHLOROthiazide 25 mg tablet Commonly known as:  HYDRODIURIL  
   
  
 meloxicam 15 mg tablet Commonly known as:  MOBIC  
   
  
 simvastatin 20 mg tablet Commonly known as:  ZOCOR Where to Get Your Medications Information on where to get these meds will be given to you by the nurse or doctor. ! Ask your nurse or doctor about these medications  
  atorvastatin 80 mg tablet  
 lisinopril-hydroCHLOROthiazide 10-12.5 mg per tablet  
 ticagrelor 90 mg tablet My Medications STOP taking these medications   
 hydroCHLOROthiazide 25 mg tablet Commonly known as:  HYDRODIURIL  
   
  
 meloxicam 15 mg tablet Commonly known as:  MOBIC  
   
  
 simvastatin 20 mg tablet Commonly known as:  ZOCOR  
   
  
  
TAKE these medications as instructed Instructions Each Dose to Equal  
 Morning Noon Evening Bedtime  
 aspirin delayed-release 81 mg tablet Your last dose was:  01/3/2018 Morning Your next dose is:  01/4/2018 Morning Take 81 mg by mouth daily. 81 mg  
    
  
   
   
   
  
 atorvastatin 80 mg tablet Commonly known as:  LIPITOR Your next dose is: Tonight Notes to Patient:  New MEDICATION Take 1 Tab by mouth nightly. 80 mg Comp Stocking,Knee,Regular,Sml Misc Commonly known as:  T.E.D. ANTI-EMBOLISM STOCKING Notes to Patient:  As you wear at home continue 1 Package by Does Not Apply route daily. 1 Package  
    
   
   
   
  
 lisinopril-hydroCHLOROthiazide 10-12.5 mg per tablet Commonly known as:  Darylene Stapler Your last dose was:  01/3/2018 Morning Your next dose is:  01/4/2018 Morning Notes to Patient:  New Medication Take 1 Tab by mouth daily. 1 Tab  
    
  
   
   
   
  
 metoprolol succinate 50 mg XL tablet Commonly known as:  TOPROL-XL Your last dose was:  01/3/2018 Morning Your next dose is:  01/4/2018 Morning Take 1 Tab by mouth daily. 50 mg  
    
  
   
   
   
  
 nitroglycerin 0.4 mg SL tablet Commonly known as:  NITROSTAT Notes to Patient:  AS NEEDED for chest pain. 1 Tab by SubLINGual route every five (5) minutes as needed for Chest Pain. Indications: ANGINA  
 0.4 mg  
    
   
   
   
  
 sertraline 100 mg tablet Commonly known as:  ZOLOFT Your last dose was:  01/3/2018 Morning Your next dose is:  01/3/2018 Afternoon Take 1 Tab by mouth two (2) times a day. 100 mg  
    
   
   
  
   
  
 ticagrelor 90 mg tablet Commonly known as:  Aminah [employer] Your last dose was:  01/3/2018 Morning Your next dose is:  01/3/2018 Evening Notes to Patient: If you encounter problems with this medications, either side effects or affordability, call [facility] Cardiology immediately. New Medication Take 1 Tab by mouth every twelve (12) hours every twelve (12) hours. 90 mg  
    
  
   
   
  
   
  
 traZODone 100 mg tablet Commonly known as:  Greg Wynn Your next dose is:  01/3/2018 Night Take 1 Tab by mouth nightly. 100 mg Where to Get Your Medications Information on where to get these meds will be given to you by the nurse or doctor. ! Ask your nurse or doctor about these medications  
  atorvastatin 80 mg tablet lisinopril-hydroCHLOROthiazide 10-12.5 mg per tablet  
 ticagrelor 90 mg tablet Discharge Instructions Left Heart Catheterization: About This Test 
What is it? Cardiac catheterization is a test to check the left side of your heart. Your doctor might look at the shape of your heart, the motion of your heart, or the blood pressure inside the chambers. Why is this test done? This test gives information about how your heart is working. It can: · Check blood flow and blood pressure in the chambers of the heart. · Check the pumping action of the heart. · Find out if a heart defect is present and how severe it is. · Find out how well the heart valves work. What happens during the test? 
· You will get medicine to help you relax. · A thin tube called a catheter is put into a blood vessel in the groin or the arm. The doctor moves the catheter through the blood vessel into your heart. · You will get a shot to numb the skin where the catheter goes in. You may feel pressure when the doctor moves the catheter through your blood vessel into your heart. · Dye may be injected into your heart. Your doctor can watch on special monitors as the dye moves in your heart. The dye helps your doctor see blood flow in your heart. · You may feel hot or flushed for several seconds when the dye is put in. 
· If a heart defect is found, cardiac catheterization sometimes is used to correct it during the test. 
How long does it take? · The test will take about 30 minutes. If a problem is found and the doctor treats it, it can take a few hours longer. What happens after the test? 
· You will stay in a room for at least a few hours to make sure the catheter site starts to heal. You may have a bandage or a compression device on your groin or arm to prevent bleeding.  
· If the catheter was placed in your groin, you may lie in bed for a few hours. If the catheter was put in your arm, you will need to keep your arm still for at least 1 hour. · You may or may not need to stay in the hospital overnight. You will get more instructions for what to do at home. · Drink plenty of fluids for several hours after the test. 
Follow-up care is a key part of your treatment and safety. Be sure to make and go to all appointments, and call your doctor if you are having problems. It's also a good idea to know your test results and keep a list of the medicines you take. Where can you learn more? Go to http://bryant-seven.info/. Enter W306 in the search box to learn more about \"Left Heart Catheterization: About This Test.\" Current as of: September 21, 2016 Content Version: 11.4 © 5775-9297 [employer]. Care instructions adapted under license by [employer] (which disclaims liability or warranty for this information). If you have questions about a medical condition or this instruction, always ask your healthcare professional. [place] [address] any warranty or liability for your use of this information. A Healthy Heart: Care Instructions Your Care Instructions Heart disease occurs when a substance called plaque builds up in the vessels that supply oxygen-rich blood to your heart. This can narrow the blood vessels and reduce blood flow. A heart attack happens when blood flow is completely blocked. A high-fat diet, smoking, and other factors increase the risk of heart disease. Your doctor has found that you have a chance of having heart disease. You can do lots of things to keep your heart healthy. It may not be easy, but you can change your diet, exercise more, and quit smoking. These steps really work to lower your chance of heart disease. Follow-up care is a key part of your treatment and safety.  Be sure to make and go to all appointments, and call your doctor if you are having problems. It's also a good idea to know your test results and keep a list of the medicines you take. How can you care for yourself at home? Diet ? · Use less salt when you cook and eat. This helps lower your blood pressure. Taste food before salting. Add only a little salt when you think you need it. With time, your taste buds will adjust to less salt. ? · Eat fewer snack items, fast foods, canned soups, and other high-salt, high-fat, processed foods. ? · Read food labels and try to avoid saturated and trans fats. They increase your risk of heart disease by raising cholesterol levels. ? · Limit the amount of solid fat-butter, margarine, and shortening-you eat. Use olive, peanut, or canola oil when you cook. Bake, broil, and steam foods instead of frying them. ? · Eating fish can lower your risk for heart disease. Eat at least 2 servings of fish a week. Adrian, mackerel, herring, sardines, and chunk light tuna are very good choices. These fish contain omega-3 fatty acids. ? · Eat a variety of fruit and vegetables every day. Dark green, deep orange, red, or yellow fruits and vegetables are especially good for you. Examples include spinach, carrots, peaches, and berries. ? · Foods high in fiber can reduce your cholesterol and provide important vitamins and minerals. High-fiber foods include whole-grain cereals and breads, oatmeal, beans, brown rice, citrus fruits, and apples. ? · Limit drinks and foods with added sugar. These include candy, desserts, and soda pop. ? Lifestyle changes ? · If your doctor recommends it, get more exercise. Walking is a good choice. Bit by bit, increase the amount you walk every day. Try for at least 30 minutes on most days of the week. You also may want to swim, bike, or do other activities. ? · Do not smoke. If you need help quitting, talk to your doctor about stop-smoking programs and medicines.  These can increase your chances of quitting for good. Quitting smoking may be the most important step you can take to protect your heart. It is never too late to quit. You will get health benefits right away. ? · Limit alcohol to 2 drinks a day for men and 1 drink a day for women. Too much alcohol can cause health problems. Medicines ? · Take your medicines exactly as prescribed. Call your doctor if you think you are having a problem with your medicine. ? · If your doctor recommends aspirin, take the amount directed each day. Make sure you take aspirin and not another kind of pain reliever, such as acetaminophen (Tylenol). If you take ibuprofen (such as Advil or Motrin) for other problems, take aspirin at least 2 hours before taking ibuprofen. When should you call for help? Call 911 if you have symptoms of a heart attack. These may include: 
? · Chest pain or pressure, or a strange feeling in the chest.  
? · Sweating. ? · Shortness of breath. ? · Pain, pressure, or a strange feeling in the back, neck, jaw, or upper belly or in one or both shoulders or arms. ? · Lightheadedness or sudden weakness. ? · A fast or irregular heartbeat. ? After you call 911, the  may tell you to chew 1 adult-strength or 2 to 4 low-dose aspirin. Wait for an ambulance. Do not try to drive yourself. ? Watch closely for changes in your health, and be sure to contact your doctor if you have any problems. Where can you learn more? Go to http://bryant-seven.info/. Enter E711 in the search box to learn more about \"A Healthy Heart: Care Instructions. \" Current as of: September 21, 2016 Content Version: 11.4 © 7436-7521 [employer] Care instructions adapted under license by [employer] (which disclaims liability or warranty for this information).  If you have questions about a medical condition or this instruction, always ask your healthcare professional. [employer] disclaims any warranty or liability for your use of this information. Low Sodium Diet (2,000 Milligram): Care Instructions Your Care Instructions Too much sodium causes your body to hold on to extra water. This can raise your blood pressure and force your heart and kidneys to work harder. In very serious cases, this could cause you to be put in the hospital. It might even be life-threatening. By limiting sodium, you will feel better and lower your risk of serious problems. The most common source of sodium is salt. People get most of the salt in their diet from canned, prepared, and packaged foods. Fast food and restaurant meals also are very high in sodium. Your doctor will probably limit your sodium to less than 2,000 milligrams (mg) a day. This limit counts all the sodium in prepared and packaged foods and any salt you add to your food. Follow-up care is a key part of your treatment and safety. Be sure to make and go to all appointments, and call your doctor if you are having problems. It's also a good idea to know your test results and keep a list of the medicines you take. How can you care for yourself at home? Read food labels · Read labels on cans and food packages. The labels tell you how much sodium is in each serving. Make sure that you look at the serving size. If you eat more than the serving size, you have eaten more sodium. · Food labels also tell you the Percent Daily Value for sodium. Choose products with low Percent Daily Values for sodium. · Be aware that sodium can come in forms other than salt, including monosodium glutamate (MSG), sodium citrate, and sodium bicarbonate (baking soda). MSG is often added to Asian food. When you eat out, you can sometimes ask for food without MSG or added salt. Buy low-sodium foods · Buy foods that are labeled \"unsalted\" (no salt added), \"sodium-free\" (less than 5 mg of sodium per serving), or \"low-sodium\" (less than 140 mg of sodium per serving). Foods labeled \"reduced-sodium\" and \"light sodium\" may still have too much sodium. Be sure to read the label to see how much sodium you are getting. · Buy fresh vegetables, or frozen vegetables without added sauces. Buy low-sodium versions of canned vegetables, soups, and other canned goods. Prepare low-sodium meals · Cut back on the amount of salt you use in cooking. This will help you adjust to the taste. Do not add salt after cooking. One teaspoon of salt has about 2,300 mg of sodium. · Take the salt shaker off the table. · Flavor your food with garlic, lemon juice, onion, vinegar, herbs, and spices. Do not use soy sauce, lite soy sauce, steak sauce, onion salt, garlic salt, celery salt, mustard, or ketchup on your food. · Use low-sodium salad dressings, sauces, and ketchup. Or make your own salad dressings and sauces without adding salt. · Use less salt (or none) when recipes call for it. You can often use half the salt a recipe calls for without losing flavor. Other foods such as rice, pasta, and grains do not need added salt. · Rinse canned vegetables, and cook them in fresh water. This removes some-but not all-of the salt. · Avoid water that is naturally high in sodium or that has been treated with water softeners, which add sodium. Call your local water company to find out the sodium content of your water supply. If you buy bottled water, read the label and choose a sodium-free brand. Avoid high-sodium foods · Avoid eating: ¨ Smoked, cured, salted, and canned meat, fish, and poultry. ¨ Ham, gomez, hot dogs, and luncheon meats. ¨ Regular, hard, and processed cheese and regular peanut butter. ¨ Crackers with salted tops, and other salted snack foods such as pretzels, chips, and salted popcorn. ¨ Frozen prepared meals, unless labeled low-sodium. ¨ Canned and dried soups, broths, and bouillon, unless labeled sodium-free or low-sodium. ¨ Canned vegetables, unless labeled sodium-free or low-sodium. ¨ Western Wendie fries, pizza, tacos, and other fast foods. ¨ Pickles, olives, ketchup, and other condiments, especially soy sauce, unless labeled sodium-free or low-sodium. Where can you learn more? Go to http://bryant-seven.info/. Enter X966 in the search box to learn more about \"Low Sodium Diet (2,000 Milligram): Care Instructions. \" Current as of: May 12, 2017 Content Version: 11.4 © 7637-4559 [employer] Gateway. Care instructions adapted under license by [employer] (which disclaims liability or warranty for this information). If you have questions about a medical condition or this instruction, always ask your healthcare professional. [address] any warranty or liability for your use of this information. DISCHARGE SUMMARY from Nurse PATIENT INSTRUCTIONS: 
 
After general anesthesia or intravenous sedation, for 24 hours or while taking prescription Narcotics: · Limit your activities · Do not drive and operate hazardous machinery · Do not make important personal or business decisions · Do  not drink alcoholic beverages · If you have not urinated within 8 hours after discharge, please contact your surgeon on call. Report the following to your surgeon: 
· Excessive pain, swelling, redness or odor of or around the surgical area · Temperature over 100.5 · Nausea and vomiting lasting longer than 4 hours or if unable to take medications · Any signs of decreased circulation or nerve impairment to extremity: change in color, persistent  numbness, tingling, coldness or increase pain · Any questions What to do at Home: 
Recommended activity: Stay on a low saturated fat, low cholesterol diet. Abstain from any heavy lifting, straining, stooping or driving for 5 days. Watch groin site for bleeding/oozing.  If so, apply firm pressure with clean cloth and call office at [phone]. Watch for signs of infection which include increasing area of redness around site, fever/hot to touch or purulent drainage. Do not soak in a tub bath for 1 week, but you may shower. *  Please give a list of your current medications to your Primary Care Provider. *  Please update this list whenever your medications are discontinued, doses are 
    changed, or new medications (including over-the-counter products) are added. *  Please carry medication information at all times in case of emergency situations. These are general instructions for a healthy lifestyle: No smoking/ No tobacco products/ Avoid exposure to second hand smoke Surgeon General's Warning:  Quitting smoking now greatly reduces serious risk to your health. Obesity, smoking, and sedentary lifestyle greatly increases your risk for illness A healthy diet, regular physical exercise & weight monitoring are important for maintaining a healthy lifestyle You may be retaining fluid if you have a history of heart failure or if you experience any of the following symptoms:  Weight gain of 3 pounds or more overnight or 5 pounds in a week, increased swelling in our hands or feet or shortness of breath while lying flat in bed. Please call your doctor as soon as you notice any of these symptoms; do not wait until your next office visit. Recognize signs and symptoms of STROKE: 
 
F-face looks uneven A-arms unable to move or move unevenly S-speech slurred or non-existent T-time-call 911 as soon as signs and symptoms begin-DO NOT go Back to bed or wait to see if you get better-TIME IS BRAIN. Warning Signs of HEART ATTACK Call 911 if you have these symptoms: 
? Chest discomfort. Most heart attacks involve discomfort in the center of the chest that lasts more than a few minutes, or that goes away and comes back.  It can feel like uncomfortable pressure, squeezing, fullness, or pain. 
? Discomfort in other areas of the upper body. Symptoms can include pain or discomfort in one or both arms, the back, neck, jaw, or stomach. ? Shortness of breath with or without chest discomfort. ? Other signs may include breaking out in a cold sweat, nausea, or lightheadedness. Don't wait more than five minutes to call [address]! Fast action can save your life. Calling 911 is almost always the fastest way to get lifesaving treatment. Emergency Medical Services staff can begin treatment when they arrive  up to an hour sooner than if someone gets to the hospital by car. The discharge information has been reviewed with the {PATIENT PARENT GUARDIAN:81007}. The {PATIENT PARENT GUARDIAN:10591} verbalized understanding. Discharge medications reviewed with the {Dishcarge meds reviewed [facility]:80556} and appropriate educational materials and side effects teaching were provided. ___________________________________________________________________________________________________________________________________ [employer] Announcement We are excited to announce that we are making your provider's discharge notes available to you in [employer]. You will see these notes when they are completed and signed by the physician that discharged you from your recent hospital stay. If you have any questions or concerns about any information you see in [employer], please call the Health Information Department where you were seen or reach out to your Primary Care Provider for more information about your plan of care. Introducing [facility] SERVICES! Dear Merrianne Severance: Thank you for requesting a [employer] account. Our records indicate that you already have an active [employer] account. You can access your account anytime at [email or web address] Did you know that you can access your hospital and ER discharge instructions at any time in [employer]?   You can also review all of your test results from your hospital stay or ER visit. Additional Information If you have questions, please visit the Frequently Asked Questions section of the [employer] website at [email or web address] Remember, [employer] is NOT to be used for urgent needs. For medical emergencies, dial 911. Now available from your iPhone and Android! Unresulted Labs-Please follow up with your PCP about these lab tests Order Current Status MAGNESIUM In process METABOLIC PANEL, BASIC In process Providers Seen During Your Hospitalization Provider Specialty Primary office phone Tammi Goldberg MD Emergency Medicine [phone] Manny Jones MD Cardiology [phone] Your Primary Care Physician (PCP) Primary Care Physician Office Phone Office Fax [address] [phone] You are allergic to the following Allergen Reactions Bactrim (Sulfamethoprim Ds) Rash Codeine Itching Sulfa (Sulfonamide Antibiotics) Hives Recent Documentation Height Weight BMI OB Status Smoking Status 1.626 m 92.3 kg 34.93 kg/m2 Hysterectomy Former Smoker Emergency Contacts Name Discharge Info Relation Home Work Mobile Guerline Joy DISCHARGE CAREGIVER [3] Mother [14] [phone] Eligio Tom Green  Daughter [21] [phone] Patient Belongings The following personal items are in your possession at time of discharge: 
  Dental Appliances: None         Home Medications: None   Jewelry: None  Clothing: None    Other Valuables: Cell Phone Please provide this summary of care documentation to your next provider. Signatures-by signing, you are acknowledging that this After Visit Summary has been reviewed with you and you have received a copy. Patient Signature:  ____________________________________________________________ Date:  ____________________________________________________________  
  
Merleen Spire Provider Signature:  ____________________________________________________________ Date:  ____________________________________________________________

## 2018-01-01 NOTE — ED TRIAGE NOTES
Pt arrives EMS from home. States she had some chest pain upon rising this morning, ~1 hour before arrival to ED. Pt states pain is substernal and radiates weakness down in left arm and nausea with it. No SOB. Pt states diaphoresis after pain began. Pt states hx of HTN, smoking, hypercholesterolemia. Pt states she had no chest pain yesterday. Hx of stent placed in 2016. Pt given 324 aspirin, 5000 heparin, 4 mg zofran. Nitros held due to BP with EMS. /60. HR 70s. Pt stemi alert with EMS. Cards to see pt in ED at this time.

## 2018-01-01 NOTE — ED PROVIDER NOTES
HPI Comments: Patient is a 79-year-old female with a history of hypertension who states this morning after waking up and letting out her dog she developed a pressure in the substernal chest.  She became diaphoretic and had marked nausea. She called EMS and they noted inferior lead ST segment elevations and gave her aspirin. She was also hypotensive with a blood pressure around 100 per EMS was given IV fluids and a heparin bolus. They held off on nitrates due to the hypotension. Patient is a 56 y.o. female presenting with chest pain. The history is provided by the patient. Chest Pain (Angina)    This is a new problem. The current episode started 1 to 2 hours ago. The problem has not changed since onset. The problem occurs constantly. The pain is associated with normal activity. The pain is present in the substernal region. The pain is moderate. The quality of the pain is described as pressure-like. The pain does not radiate. Associated symptoms include diaphoresis, malaise/fatigue and nausea. Pertinent negatives include no abdominal pain, no fever, no hemoptysis, no irregular heartbeat, no lower extremity edema, no palpitations and no sputum production. She has tried aspirin (and heparin bolus by EMS) for the symptoms. The treatment provided mild relief. Risk factors include hypertension. Her past medical history is significant for HTN. Procedural history includes cardiac catheterization. Pertinent negatives include no cardiac stents.        Past Medical History:   Diagnosis Date    Atherosclerosis of native coronary artery of native heart without angina pectoris 2016    Depression     GERD (gastroesophageal reflux disease)     Hypercholesterolemia     Hypertension     Sleep apnea        Past Surgical History:   Procedure Laterality Date    HX BREAST BIOPSY      right biopsy    HX  SECTION      HX  SECTION      HX HYSTERECTOMY      HX TUBAL LIGATION  1993         Family History:   Problem Relation Age of Onset    Hypertension Mother     Thyroid Disease Mother     No Known Problems Father     Breast Cancer Neg Hx        Social History     Social History    Marital status: SINGLE     Spouse name: N/A    Number of children: N/A    Years of education: N/A     Occupational History    Not on file. Social History Main Topics    Smoking status: Former Smoker     Packs/day: 0.25     Types: Cigarettes     Quit date: 1/1/2015    Smokeless tobacco: Never Used    Alcohol use No    Drug use: No    Sexual activity: Not on file     Other Topics Concern    Not on file     Social History Narrative         ALLERGIES: Bactrim [sulfamethoprim ds]; Codeine; and Sulfa (sulfonamide antibiotics)    Review of Systems   Constitutional: Positive for diaphoresis and malaise/fatigue. Negative for chills and fever. HENT: Negative. Eyes: Negative. Respiratory: Negative. Negative for hemoptysis and sputum production. Cardiovascular: Positive for chest pain. Negative for palpitations. Gastrointestinal: Positive for nausea. Negative for abdominal pain. Endocrine: Negative. Genitourinary: Negative. Musculoskeletal: Negative. Skin: Negative. Neurological: Negative. Vitals:    01/01/18 0847   BP: 135/72   Pulse: 71   Resp: 12   Temp: 97 °F (36.1 °C)   SpO2: 98%   Weight: 91.2 kg (201 lb)   Height: 5' 4\" (1.626 m)            Physical Exam   Constitutional: She is oriented to person, place, and time. She appears well-developed. Overweight and uncomfortable appearing   HENT:   Head: Normocephalic and atraumatic. Eyes: Conjunctivae and EOM are normal. Pupils are equal, round, and reactive to light. Cardiovascular: Normal rate, regular rhythm and intact distal pulses. Pulmonary/Chest: Effort normal and breath sounds normal. She exhibits no tenderness. Abdominal: Soft. There is no tenderness. There is no rebound and no guarding.    Musculoskeletal: Normal range of motion. She exhibits no edema or tenderness. Neurological: She is alert and oriented to person, place, and time. Skin: Skin is warm and dry. No rash noted. Nursing note and vitals reviewed. MDM  Number of Diagnoses or Management Options  Diagnosis management comments: STEMI alert was called prior to arrival when EMS transmitted the EKG showing inferior ST segment elevation    EKG here in the department shows normal sinus rhythm at a rate of 65 with inferior ST segment elevation in the inferior leads consistent with an acute infarct       Amount and/or Complexity of Data Reviewed  Clinical lab tests: ordered and reviewed  Tests in the radiology section of CPT®: ordered and reviewed  Review and summarize past medical records: yes    Risk of Complications, Morbidity, and/or Mortality  Presenting problems: high  Diagnostic procedures: high  Management options: high    Critical Care  Total time providing critical care: < 30 minutes    Patient Progress  Patient progress: stable    ED Course     Patient is being taken emergently to the Cath Lab by cardiology. Dr. Sonia Myles has seen the patient here in the ER. Voice dictation software was used during the making of this note. This software is not perfect and grammatical and other typographical errors may be present. This note has been proofread, but may still contain errors.   Shan Suggs MD; 1/1/2018 @9:00 AM   ===================================================================      Procedures

## 2018-01-01 NOTE — PROCEDURES
Brief Cardiac Procedure Note    Patient: River Doshi MRN: [id number]  SSN: [id number]    YOB: 1960  Age: 56 y.o. Sex: female      Date of Procedure: 1/1/2018     Pre-procedure Diagnosis: NSTEMI    Post-procedure Diagnosis: Coronary artery disease    Procedure: Left Heart Catheterization with PCI    Brief Description of Procedure: As above    Performed By: Rosy Ramires MD     Assistants: None    Anesthesia: Moderate Sedation    Estimated Blood Loss: Less than 10 mL      Specimens: None    Implants: None    Findings: Obstructive CAD. PCI of mid RCA with 3.0 x 28 mm Synergy. Vfib with crossing lesion with wire. 200 J with successful restoration of sinus rhythm. Complications: None    Recommendations: Continue medical therapy.     Signed By: Rosy Ramires MD     January 1, 2018

## 2018-01-01 NOTE — IP AVS SNAPSHOT
Romana Halo 
 
 
 [address] [facility] 
[phone] Patient: DIEGO Uribe MRN: [id number] :1960 A check betsy indicates which time of day the medication should be taken. My Medications START taking these medications Instructions Each Dose to Equal  
 Morning Noon Evening Bedtime  
 atorvastatin 80 mg tablet Commonly known as:  LIPITOR Your next dose is: Tonight Notes to Patient:  New MEDICATION Take 1 Tab by mouth nightly. 80 mg  
    
   
   
   
  
  
 lisinopril-hydroCHLOROthiazide 10-12.5 mg per tablet Commonly known as:  Андрей Dougie Your last dose was:  01/3/2018 Morning Your next dose is:  2018 Morning Notes to Patient:  New Medication Take 1 Tab by mouth daily. 1 Tab  
    
  
   
   
   
  
 ticagrelor 90 mg tablet Commonly known as:  Aminah [employer] Your last dose was:  01/3/2018 Morning Your next dose is:  01/3/2018 Evening Notes to Patient: If you encounter problems with this medications, either side effects or affordability, call [facility] immediately. New Medication Take 1 Tab by mouth every twelve (12) hours every twelve (12) hours. 90 mg CONTINUE taking these medications Instructions Each Dose to Equal  
 Morning Noon Evening Bedtime  
 aspirin delayed-release 81 mg tablet Your last dose was:  01/3/2018 Morning Your next dose is:  2018 Morning Take 81 mg by mouth daily. 81 mg Comp Stocking,Knee,Regular,Sml Misc Commonly known as:  T.E.D. ANTI-EMBOLISM STOCKING Notes to Patient:  As you wear at home continue 1 Package by Does Not Apply route daily. 1 Package  
    
   
   
   
  
 metoprolol succinate 50 mg XL tablet Commonly known as:  TOPROL-XL Your last dose was:  01/3/2018 Morning Your next dose is:  2018 Morning Take 1 Tab by mouth daily. 50 mg  
    
  
   
   
   
  
 nitroglycerin 0.4 mg SL tablet Commonly known as:  NITROSTAT Notes to Patient:  AS NEEDED for chest pain. 1 Tab by SubLINGual route every five (5) minutes as needed for Chest Pain. Indications: ANGINA  
 0.4 mg  
    
   
   
   
  
 sertraline 100 mg tablet Commonly known as:  ZOLOFT Your last dose was:  01/3/2018 Morning Your next dose is:  01/3/2018 Afternoon Take 1 Tab by mouth two (2) times a day. 100 mg  
    
   
   
  
   
  
 traZODone 100 mg tablet Commonly known as:  Caleb Mo Your next dose is:  01/3/2018 Night Take 1 Tab by mouth nightly. 100 mg  
    
   
   
   
  
  
  
STOP taking these medications   
 hydroCHLOROthiazide 25 mg tablet Commonly known as:  HYDRODIURIL  
   
  
 meloxicam 15 mg tablet Commonly known as:  MOBIC  
   
  
 simvastatin 20 mg tablet Commonly known as:  ZOCOR Where to Get Your Medications Information on where to get these meds will be given to you by the nurse or doctor. ! Ask your nurse or doctor about these medications  
  atorvastatin 80 mg tablet  
 lisinopril-hydroCHLOROthiazide 10-12.5 mg per tablet  
 ticagrelor 90 mg tablet

## 2018-01-02 LAB
ANION GAP SERPL CALC-SCNC: 7 MMOL/L (ref 7–16)
ATRIAL RATE: 54 BPM
BASOPHILS # BLD: 0 K/UL (ref 0–0.2)
BASOPHILS NFR BLD: 0 % (ref 0–2)
BUN SERPL-MCNC: 13 MG/DL (ref 6–23)
CALCIUM SERPL-MCNC: 8 MG/DL (ref 8.3–10.4)
CALCULATED P AXIS, ECG09: 43 DEGREES
CALCULATED R AXIS, ECG10: 13 DEGREES
CALCULATED T AXIS, ECG11: -38 DEGREES
CHLORIDE SERPL-SCNC: 108 MMOL/L (ref 98–107)
CHOLEST SERPL-MCNC: 117 MG/DL
CO2 SERPL-SCNC: 28 MMOL/L (ref 21–32)
CREAT SERPL-MCNC: 0.7 MG/DL (ref 0.6–1)
DIAGNOSIS, 93000: NORMAL
DIFFERENTIAL METHOD BLD: ABNORMAL
EOSINOPHIL # BLD: 0.1 K/UL (ref 0–0.8)
EOSINOPHIL NFR BLD: 1 % (ref 0.5–7.8)
ERYTHROCYTE [DISTWIDTH] IN BLOOD BY AUTOMATED COUNT: 14.5 % (ref 11.9–14.6)
GLUCOSE SERPL-MCNC: 108 MG/DL (ref 65–100)
HCT VFR BLD AUTO: 35 % (ref 35.8–46.3)
HDLC SERPL-MCNC: 32 MG/DL (ref 40–60)
HDLC SERPL: 3.7 {RATIO}
HGB BLD-MCNC: 10.8 G/DL (ref 11.7–15.4)
IMM GRANULOCYTES # BLD: 0 K/UL (ref 0–0.5)
IMM GRANULOCYTES NFR BLD AUTO: 0 % (ref 0–5)
LDLC SERPL CALC-MCNC: 64.4 MG/DL
LIPID PROFILE,FLP: ABNORMAL
LYMPHOCYTES # BLD: 2.4 K/UL (ref 0.5–4.6)
LYMPHOCYTES NFR BLD: 36 % (ref 13–44)
MAGNESIUM SERPL-MCNC: 1.9 MG/DL (ref 1.8–2.4)
MCH RBC QN AUTO: 26.7 PG (ref 26.1–32.9)
MCHC RBC AUTO-ENTMCNC: 30.9 G/DL (ref 31.4–35)
MCV RBC AUTO: 86.6 FL (ref 79.6–97.8)
MONOCYTES # BLD: 0.7 K/UL (ref 0.1–1.3)
MONOCYTES NFR BLD: 10 % (ref 4–12)
NEUTS SEG # BLD: 3.6 K/UL (ref 1.7–8.2)
NEUTS SEG NFR BLD: 53 % (ref 43–78)
P-R INTERVAL, ECG05: 148 MS
PLATELET # BLD AUTO: 312 K/UL (ref 150–450)
PMV BLD AUTO: 10 FL (ref 10.8–14.1)
POTASSIUM SERPL-SCNC: 3.8 MMOL/L (ref 3.5–5.1)
Q-T INTERVAL, ECG07: 472 MS
QRS DURATION, ECG06: 82 MS
QTC CALCULATION (BEZET), ECG08: 447 MS
RBC # BLD AUTO: 4.04 M/UL (ref 4.05–5.25)
SODIUM SERPL-SCNC: 143 MMOL/L (ref 136–145)
TRIGL SERPL-MCNC: 103 MG/DL (ref 35–150)
VENTRICULAR RATE, ECG03: 54 BPM
VLDLC SERPL CALC-MCNC: 20.6 MG/DL (ref 6–23)
WBC # BLD AUTO: 6.8 K/UL (ref 4.3–11.1)

## 2018-01-02 PROCEDURE — 85025 COMPLETE CBC W/AUTO DIFF WBC: CPT | Performed by: INTERNAL MEDICINE

## 2018-01-02 PROCEDURE — 80061 LIPID PANEL: CPT | Performed by: INTERNAL MEDICINE

## 2018-01-02 PROCEDURE — 74011250637 HC RX REV CODE- 250/637: Performed by: INTERNAL MEDICINE

## 2018-01-02 PROCEDURE — 65660000000 HC RM CCU STEPDOWN

## 2018-01-02 PROCEDURE — 36415 COLL VENOUS BLD VENIPUNCTURE: CPT | Performed by: INTERNAL MEDICINE

## 2018-01-02 PROCEDURE — 93005 ELECTROCARDIOGRAM TRACING: CPT | Performed by: INTERNAL MEDICINE

## 2018-01-02 PROCEDURE — 80048 BASIC METABOLIC PNL TOTAL CA: CPT | Performed by: INTERNAL MEDICINE

## 2018-01-02 PROCEDURE — 93306 TTE W/DOPPLER COMPLETE: CPT

## 2018-01-02 PROCEDURE — 83735 ASSAY OF MAGNESIUM: CPT | Performed by: INTERNAL MEDICINE

## 2018-01-02 PROCEDURE — 74011250637 HC RX REV CODE- 250/637: Performed by: PHYSICIAN ASSISTANT

## 2018-01-02 RX ADMIN — SERTRALINE HYDROCHLORIDE 100 MG: 100 TABLET ORAL at 08:11

## 2018-01-02 RX ADMIN — ACETAMINOPHEN 650 MG: 325 TABLET ORAL at 08:32

## 2018-01-02 RX ADMIN — METOPROLOL TARTRATE 25 MG: 25 TABLET ORAL at 21:47

## 2018-01-02 RX ADMIN — ACETAMINOPHEN 650 MG: 325 TABLET ORAL at 01:15

## 2018-01-02 RX ADMIN — Medication 10 ML: at 21:48

## 2018-01-02 RX ADMIN — SERTRALINE HYDROCHLORIDE 100 MG: 100 TABLET ORAL at 17:21

## 2018-01-02 RX ADMIN — TRAZODONE HYDROCHLORIDE 100 MG: 50 TABLET ORAL at 21:47

## 2018-01-02 RX ADMIN — Medication 5 ML: at 14:43

## 2018-01-02 RX ADMIN — TICAGRELOR 90 MG: 90 TABLET ORAL at 08:11

## 2018-01-02 RX ADMIN — ASPIRIN 81 MG 81 MG: 81 TABLET ORAL at 08:11

## 2018-01-02 RX ADMIN — TICAGRELOR 90 MG: 90 TABLET ORAL at 21:47

## 2018-01-02 RX ADMIN — METOPROLOL TARTRATE 25 MG: 25 TABLET ORAL at 08:33

## 2018-01-02 RX ADMIN — ATORVASTATIN CALCIUM 80 MG: 40 TABLET, FILM COATED ORAL at 21:47

## 2018-01-02 NOTE — PROGRESS NOTES
Date of Outreach Update: Renee Tucker was seen and assessed. Previous Outreach assessment has been reviewed. There have been no significant clinical changes since the completion of the last dated Outreach assessment. Will continue to follow up per outreach protocol.     Signed By:   Zander Azevedo RN    January 2, 2018 5:59 PM

## 2018-01-02 NOTE — PROGRESS NOTES
Patient doing well after transfer to [facility]. No CP today. Ambulates in room without distress or difficulty. Pt with no complaints and otherwise resting comfortably. No apparent needs or distress noted.

## 2018-01-02 NOTE — PROGRESS NOTES
[facility] CARDIOLOGY PROGRESS NOTE    1/2/2018 7:24 AM    Admit Date: 1/1/2018    Admit Diagnosis: STEMI (ST elevation myocardial infarction) (HCC);STEMI (ST *      Subjective:   Stable overnight without angina, CHF, or palpitations. Vitals stable and controlled. No other complaints overnight. Tolerating meds well. Objective:      Vitals:    01/02/18 0433 01/02/18 0503 01/02/18 0603 01/02/18 0703   BP:  122/65 132/70 133/83   Pulse:  (!) 56 (!) 55 (!) 56   Resp:  10 11 15   Temp:    98 °F (36.7 °C)   SpO2:  95% 96% 96%   Weight: 63.8 kg (140 lb 11.2 oz)      Height:           Physical Exam:  Neck- supple, no JVD  CV- regular rate and rhythm no MRG  Lung- clear bilaterally  Abd- soft, nontender, nondistended  Ext- no edema, right wrist CDI  Skin- warm and dry    Data Review:   Recent Labs      01/02/18   0413  01/01/18   2102   01/01/18   0854   NA  143   --    --   142   K  3.8   --    --   4.4   MG  1.9   --    --    --    BUN  13   --    --   13   CREA  0.70   --    --   0.86   GLU  108*   --    --   151*   WBC  6.8  7.3   < >  7.8   HGB  10.8*  11.0*   < >  13.7   HCT  35.0*  34.4*   < >  42.0   PLT  312  297   < >  357   CHOL  117   --    --    --    TRIGL  103   --    --    --    HDL  32*   --    --    --     < > = values in this interval not displayed. Assessment and Plan:       ST elevation myocardial infarction (STEMI) of inferior wall ([facility] [address] (1/1/2018)- improved, resolved, to floor today, home tomorrow if continues to do well. Active Problems:    Hypertension ()- stable, marginal BP overnight, no ACE/ARB at present, continue other meds for now. Hypercholesterolemia ()- stable, continue max statin      Tobacco use (1/1/2018)- cessation encouraged      Ventricular fibrillation ([facility] [address] (1/1/2018)- after opening vessel in STEMI, none since, echo today. Echo today, BMP and CBC in AM, home tomorrow? KRYS Red MD  [address] Cardiology  Pager [phone]

## 2018-01-02 NOTE — PROGRESS NOTES
Critical Care Outreach Nurse Progress Report:    Subjective: In to assess pt secondary to recent tx from ICU. MEWS Score: 1 (01/02/18 0920)    Vitals:    01/02/18 0703 01/02/18 0811 01/02/18 0833 01/02/18 0920   BP: 133/83 (!) 133/91  128/74   Pulse: (!) 56 (!) 58 61 (!) 58   Resp: 15 16  18   Temp: 98 °F (36.7 °C)   98 °F (36.7 °C)   SpO2: 96% 97%  97%   Weight:       Height:            Objective: Adult female pt lying in bed, talking on cell phone. Primary RN at bedside. Pain Intensity 1: 0 (01/02/18 0920)  Pain Location 1: Knee  Pain Intervention(s) 1: Medication (see MAR)  Patient Stated Pain Goal: 0    Assessment:   Adult female pt lying in bed. Pt appears comfortable and in no distress. Stable at this time. Plan: Will follow per outreach protocol.

## 2018-01-02 NOTE — PROGRESS NOTES
Care Management Interventions  PCP Verified by CM: Yes (Dec 2017)  Transition of Care Consult (CM Consult): Discharge Planning  Current Support Network: Other (lives with her 28 yo old son )  Confirm Follow Up Transport: Family  Plan discussed with Pt/Family/Caregiver: Yes  Freedom of Choice Offered: Yes  Discharge Location  Discharge Placement: Home  Met with patient for d/c planning. Patient alert and oriented x 3 independent of ADL's and lives with her son. She works at [employer] in [place] prior to admission. She requires no DME and was able to drive prior to admission. Patient voices no concerns or needs. D/C plan is home when medically stable.

## 2018-01-02 NOTE — PROGRESS NOTES
was called by daughter of pt who will be the appointed agent in pts HCPOA.  went over the form and educated by daughter and pt regarding the form and witnesses. Form was completed by daughter and pt without witnesses.  returned the form to the pt and daughter to complete with witnesses.  stated that if they needed any more assistance to please contact the chaplains department.

## 2018-01-02 NOTE — PROGRESS NOTES
Problem: Falls - Risk of  Goal: *Absence of Falls  Document Ramon Fall Risk and appropriate interventions in the flowsheet.    Outcome: Progressing Towards Goal  Fall Risk Interventions:            Medication Interventions: Bed/chair exit alarm, Patient to call before getting OOB    Elimination Interventions: Bed/chair exit alarm, Call light in reach

## 2018-01-02 NOTE — PROGRESS NOTES
TRANSFER - OUT REPORT:    Verbal report given to Tran Arthur RN (name) on [facility]  being transferred to [facility] (unit) for routine progression of care       Report consisted of patients Situation, Background, Assessment and   Recommendations(SBAR). Information from the following report(s) SBAR, Kardex, ED Summary, Procedure Summary, Intake/Output, MAR, Recent Results and Cardiac Rhythm sinus benedicto/NSR was reviewed with the receiving nurse. Lines:   Peripheral IV 01/01/18 Right Antecubital (Active)   Site Assessment Clean, dry, & intact 1/2/2018  7:03 AM   Phlebitis Assessment 0 1/2/2018  7:03 AM   Infiltration Assessment 0 1/2/2018  7:03 AM   Dressing Status Clean, dry, & intact 1/2/2018  7:03 AM   Dressing Type Tape;Transparent 1/2/2018  7:03 AM   Hub Color/Line Status Blue;Patent; Flushed; Infusing 1/2/2018  7:03 AM   Alcohol Cap Used No 1/2/2018  7:03 AM        Opportunity for questions and clarification was provided.       Patient transported with:   O2 @ 2 liters, tele box

## 2018-01-02 NOTE — PROGRESS NOTES
Date of Outreach Update: Colletta Erichsen was seen and assessed. Previous Outreach assessment has been reviewed. There have been no significant clinical changes since the completion of the last dated Outreach assessment. Will continue to follow up per outreach protocol.     Signed By:   Sarah Boogie RN    January 2, 2018 2:20 PM

## 2018-01-02 NOTE — PROGRESS NOTES
Problem: Falls - Risk of  Goal: *Absence of Falls  Document Ramon Fall Risk and appropriate interventions in the flowsheet. Outcome: Progressing Towards Goal  Fall Risk Interventions:  Mobility Interventions: Communicate number of staff needed for ambulation/transfer, Patient to call before getting OOB      Pt progressing towards goal. No falls since admission. Bed low and locked. Call light within reach. Side rails x 2. Gripper socks applied. Personal belongings within reach. Pt verbalizes understanding to call for assistance.        Medication Interventions: Evaluate medications/consider consulting pharmacy, Patient to call before getting OOB, Teach patient to arise slowly    Elimination Interventions: Call light in reach, Patient to call for help with toileting needs, Toilet paper/wipes in reach, Toileting schedule/hourly rounds

## 2018-01-02 NOTE — PROGRESS NOTES
TRANSFER - IN REPORT:    Verbal report received from [facility], [address] on [facility] [employer] being received from ICU for routine progression of care      Report consisted of patients Situation, Background, Assessment and Recommendations(SBAR). Information from the following report(s) SBAR, Kardex and MAR was reviewed with the receiving nurse. Opportunity for questions and clarification was provided. Assessment completed upon patients arrival to unit and care assumed.

## 2018-01-02 NOTE — PROGRESS NOTES
Patient to tele room via stretcher from ICU. Patient transferred to bed via moves self. Patient placed in gown and monitor leads applied. Pt is alert and oriented x4, calm, cooperative and follows commands appropriately. Patient presently without CP, SOB, or n/v. No present complaints reported from patient. LS diminished, but clear. Breathing even, regular, and non-labored. Oxygen d/c upon arrival. No room air. SB via telemetry, without ectopy. Negative for edema in extremities. VSS. No distress noted. gtts running: post cath NS. D/c upon arrival    Dual skin assessment with secondary RN. Skin is overall intact. R radial cath site--open to air. No bleeding bruising or hematoma noted. Large purple bruise noted to R shoulder. Pt repositioned in bed and turns self appropriately. Patient oriented to room and floor, no questions voiced at this time. Plan of care reviewed. Patient voiced understanding to use call light to communicate needs.

## 2018-01-02 NOTE — PROGRESS NOTES
Bedside shift change report given to Theda Rubinstein (oncoming nurse) by WILLIAM Dorado (offgoing nurse). Report included the following information SBAR, Kardex, ED Summary, Procedure Summary, Intake/Output, MAR, Recent Results and Cardiac Rhythm sinus benedicto/NSR.

## 2018-01-03 VITALS
BODY MASS INDEX: 34.74 KG/M2 | TEMPERATURE: 97.1 F | OXYGEN SATURATION: 98 % | HEIGHT: 64 IN | SYSTOLIC BLOOD PRESSURE: 149 MMHG | RESPIRATION RATE: 18 BRPM | WEIGHT: 203.5 LBS | DIASTOLIC BLOOD PRESSURE: 87 MMHG | HEART RATE: 62 BPM

## 2018-01-03 LAB
ANION GAP SERPL CALC-SCNC: 8 MMOL/L (ref 7–16)
BUN SERPL-MCNC: 12 MG/DL (ref 6–23)
CALCIUM SERPL-MCNC: 8.5 MG/DL (ref 8.3–10.4)
CHLORIDE SERPL-SCNC: 108 MMOL/L (ref 98–107)
CO2 SERPL-SCNC: 26 MMOL/L (ref 21–32)
CREAT SERPL-MCNC: 0.64 MG/DL (ref 0.6–1)
ERYTHROCYTE [DISTWIDTH] IN BLOOD BY AUTOMATED COUNT: 14 % (ref 11.9–14.6)
GLUCOSE SERPL-MCNC: 90 MG/DL (ref 65–100)
HCT VFR BLD AUTO: 35.4 % (ref 35.8–46.3)
HGB BLD-MCNC: 11.1 G/DL (ref 11.7–15.4)
MAGNESIUM SERPL-MCNC: 2.1 MG/DL (ref 1.8–2.4)
MCH RBC QN AUTO: 26.7 PG (ref 26.1–32.9)
MCHC RBC AUTO-ENTMCNC: 31.4 G/DL (ref 31.4–35)
MCV RBC AUTO: 85.1 FL (ref 79.6–97.8)
PLATELET # BLD AUTO: 315 K/UL (ref 150–450)
PMV BLD AUTO: 10.4 FL (ref 10.8–14.1)
POTASSIUM SERPL-SCNC: 4 MMOL/L (ref 3.5–5.1)
RBC # BLD AUTO: 4.16 M/UL (ref 4.05–5.25)
SODIUM SERPL-SCNC: 142 MMOL/L (ref 136–145)
WBC # BLD AUTO: 6.3 K/UL (ref 4.3–11.1)

## 2018-01-03 PROCEDURE — 36415 COLL VENOUS BLD VENIPUNCTURE: CPT | Performed by: INTERNAL MEDICINE

## 2018-01-03 PROCEDURE — 85027 COMPLETE CBC AUTOMATED: CPT | Performed by: INTERNAL MEDICINE

## 2018-01-03 PROCEDURE — 80048 BASIC METABOLIC PNL TOTAL CA: CPT | Performed by: INTERNAL MEDICINE

## 2018-01-03 PROCEDURE — 83735 ASSAY OF MAGNESIUM: CPT | Performed by: INTERNAL MEDICINE

## 2018-01-03 PROCEDURE — 74011250637 HC RX REV CODE- 250/637: Performed by: INTERNAL MEDICINE

## 2018-01-03 RX ORDER — LISINOPRIL 5 MG/1
5 TABLET ORAL DAILY
Qty: 30 TAB | Refills: 6 | Status: SHIPPED | OUTPATIENT
Start: 2018-01-03 | End: 2018-01-03

## 2018-01-03 RX ORDER — METOPROLOL SUCCINATE 50 MG/1
50 TABLET, EXTENDED RELEASE ORAL DAILY
Status: DISCONTINUED | OUTPATIENT
Start: 2018-01-03 | End: 2018-01-03 | Stop reason: HOSPADM

## 2018-01-03 RX ORDER — LISINOPRIL AND HYDROCHLOROTHIAZIDE 10; 12.5 MG/1; MG/1
1 TABLET ORAL DAILY
Qty: 30 TAB | Refills: 6 | Status: SHIPPED | OUTPATIENT
Start: 2018-01-03 | End: 2018-01-31 | Stop reason: SINTOL

## 2018-01-03 RX ORDER — LISINOPRIL AND HYDROCHLOROTHIAZIDE 10; 12.5 MG/1; MG/1
1 TABLET ORAL DAILY
Status: DISCONTINUED | OUTPATIENT
Start: 2018-01-03 | End: 2018-01-03 | Stop reason: HOSPADM

## 2018-01-03 RX ORDER — ATORVASTATIN CALCIUM 80 MG/1
80 TABLET, FILM COATED ORAL
Qty: 30 TAB | Refills: 6 | Status: SHIPPED | OUTPATIENT
Start: 2018-01-03 | End: 2018-07-19 | Stop reason: SDUPTHER

## 2018-01-03 RX ADMIN — LISINOPRIL AND HYDROCHLOROTHIAZIDE 1 TABLET: 12.5; 1 TABLET ORAL at 08:38

## 2018-01-03 RX ADMIN — TICAGRELOR 90 MG: 90 TABLET ORAL at 08:38

## 2018-01-03 RX ADMIN — ASPIRIN 81 MG 81 MG: 81 TABLET ORAL at 08:39

## 2018-01-03 RX ADMIN — METOPROLOL SUCCINATE 50 MG: 50 TABLET, FILM COATED, EXTENDED RELEASE ORAL at 08:38

## 2018-01-03 RX ADMIN — Medication 10 ML: at 05:54

## 2018-01-03 RX ADMIN — SERTRALINE HYDROCHLORIDE 100 MG: 100 TABLET ORAL at 08:39

## 2018-01-03 NOTE — PROGRESS NOTES
Verbal bedside report given to monty Erickson RN. Patient's situation, background, assessment and recommendations provided. Kardex, Mar, and recent results also reviewed. Opportunity for questions provided. Oncoming RN assumed care of patient.

## 2018-01-03 NOTE — PROGRESS NOTES
Discharge instructions reviewed with patient. Prescriptions given for Lipitor, lisinopril-hydrochlorothiazide, and brilenta and med info sheets provided for all new medications. Opportunity for questions provided. Patient voiced understanding of all discharge instructions. IV and monitor removed by primary nurse. Monitor 3044.

## 2018-01-03 NOTE — PROGRESS NOTES
Date of Outreach Update: Cindi Tracey was seen and assessed. MEWS Score: 1 (01/03/18 0046)  Vitals:    01/02/18 1444 01/02/18 1926 01/02/18 2135 01/03/18 0046   BP: 130/76 144/88 127/62 127/73   Pulse: (!) 55 67 68 (!) 55   Resp: 16 20 18 18   Temp: 98 °F (36.7 °C) 98.3 °F (36.8 °C) 97.7 °F (36.5 °C) 97.8 °F (36.6 °C)   SpO2: 95% 97% 97% 97%   Weight:       Height:             Pain Assessment  Pain Intensity 1: 0 (01/03/18 0040)  Pain Location 1: Knee  Pain Intervention(s) 1: Medication (see MAR)  Patient Stated Pain Goal: 0      Previous Outreach assessment has been reviewed. There have been no significant clinical changes since the completion of the last dated Outreach assessment. Pt states some SOB after ambulating to restroom, but states it is better now. Monitor checked, no obvious ekg changes. Primary RN made aware. Will continue to follow up per outreach protocol.     Signed By:   Nevaeh Morales    January 3, 2018 4:03 AM

## 2018-01-03 NOTE — PROGRESS NOTES
[facility] CRITICAL CARE OUTREACH NURSE PROGRESS REPORT      SUBJECTIVE: Called to assess patient secondary to outreach program after discharged from ICU. MEWS Score: 1 (01/02/18 1926)  Vitals:    01/02/18 0930 01/02/18 1444 01/02/18 1926 01/02/18 2135   BP: 135/81 130/76 144/88 127/62   Pulse: 69 (!) 55 67 68   Resp: 18 16 20 18   Temp: 97.4 °F (36.3 °C) 98 °F (36.7 °C) 98.3 °F (36.8 °C) 97.7 °F (36.5 °C)   SpO2: 97% 95% 97% 97%   Weight: 91.2 kg (201 lb)      Height: 5' 4\" (1.626 m)           LAB DATA:    Recent Labs      01/02/18   0413  01/01/18   0854   NA  143  142   K  3.8  4.4   CL  108*  106   CO2  28  29   AGAP  7  7   GLU  108*  151*   BUN  13  13   CREA  0.70  0.86   GFRAA  >60  >60   GFRNA  >60  >60   CA  8.0*  8.9   MG  1.9   --    ALB   --   3.8   TP   --   8.8*   GLOB   --   5.0*   AGRAT   --   0.8*   ALT   --   32        Recent Labs      01/02/18   0413  01/01/18   2102  01/01/18   1517   WBC  6.8  7.3  8.5   HGB  10.8*  11.0*  11.9   HCT  35.0*  34.4*  36.7   PLT  312  297  311          OBJECTIVE: On arrival to room, I found patient to be in bed, tech at bedside checking vitals. Pain Assessment  Pain Intensity 1: 0 (01/02/18 2000)  Pain Location 1: Knee  Pain Intervention(s) 1: Medication (see MAR)  Patient Stated Pain Goal: 0         ASSESSMENT:  Pt alert and oriented. Denies SOB or CP. HR 64, SBP 120s, O2 sats 98% on RA. Lungs are CTA. Pt denies needs at this time. PLAN:  Continue to monitor per outreach protocol.

## 2018-01-03 NOTE — PROGRESS NOTES
Critical Care Outreach Nurse Progress Report:    Subjective: In to assess pt secondary to recent tx from ICU. MEWS Score: 1 (01/03/18 0445)    Vitals:    01/02/18 1926 01/02/18 2135 01/03/18 0046 01/03/18 0445   BP: 144/88 127/62 127/73 149/87   Pulse: 67 68 (!) 55 62   Resp: 20 18 18 18   Temp: 98.3 °F (36.8 °C) 97.7 °F (36.5 °C) 97.8 °F (36.6 °C) 97.1 °F (36.2 °C)   SpO2: 97% 97% 97% 98%   Weight:    92.3 kg (203 lb 8 oz)   Height:            Objective: Adult female pt sitting on side of bed, talking on cell phone. Pain Intensity 1: 0 (01/03/18 0750)  Pain Location 1: Knee  Pain Intervention(s) 1: Medication (see MAR)  Patient Stated Pain Goal: 0    Assessment: Awake, alert female pt sitting on side of bed, talking on cell phone. No distress noted. Pt has no complaints and is being discharged home today. Stable at this time. Plan: Pt being discharged home.   Will remove from outreach list.

## 2018-01-03 NOTE — PROGRESS NOTES
Care Management Interventions  PCP Verified by CM: Yes (Dec 2017)  Mode of Transport at Discharge: Other (see comment) (family)  Transition of Care Consult (CM Consult): Discharge Planning  Current Support Network: Other (lives with her 28 yo old son )  Confirm Follow Up Transport: Family  Plan discussed with Pt/Family/Caregiver: Yes  Freedom of Choice Offered: Yes  Discharge Location  Discharge Placement: Home  Patient discharged home with family. Voices no needs or concerns.

## 2018-01-03 NOTE — DISCHARGE SUMMARY
Physician Discharge Summary     Patient ID:  Cindi Tracey  [id number]  41 y.o.  1960    Admit date: 1/1/2018    Discharge date and time: 1/3/18    Admitting Physician: Katerin Kenyon MD     Primary Cardiologist:Dr. Sam Ivan     Primary Care MD:Jamie Holloway NP    Discharge Physician: Aly Hinkle NP    Admission Diagnoses: STEMI (ST elevation myocardial infarction) [facility])  STEMI (ST elevation myocardial infarction) [facility])    Discharge Diagnoses:   Patient Active Problem List    Diagnosis Date Noted    ST elevation myocardial infarction (STEMI) of inferior wall ([facility] 01/01/2018    Tobacco use 01/01/2018    [facility]/2018    Ventricular fibrillation [facility] 01/01/2018    Recurrent major depressive disorder ([facility] 08/15/2017    Generalized anxiety disorder 08/15/2017    Insomnia 01/11/2017    Visit for screening mammogram 09/01/2016    Atherosclerosis of native coronary artery of native heart without angina pectoris 04/21/2016    Abnormal cardiovascular stress test 03/28/2016    Abnormal mammogram 03/07/2016    Hypertension     Hypercholesterolemia     Depression            Hospital Course: Patient with known mild to moderate nonobstructive coronary artery disease based on catheterization in 2016 presents with ongoing chest pain. She activated EMS due to substernal chest pressure. No radiation. Mild diaphoresis and dyspnea. EMS noted inferior ST elevation with reciprocal ST depression in the lateral leads. She currently rates her chest pain a 10 out of 10. No cardiac arrhythmias. She has received heparin bolus and aspirin. She has hypertension and tobacco use.        STEMI protocol was activated and pt was taken to cath lab emergently. PCI performed to RCA successfully. She was monitored closely on telemetry without arrhythmias.  She was seen and evaluated by Dr. Olivier Miller this am and felt acceptable for discharge with one week follow up, enrollment in cardiac rehab. It was discussed with patient the importance of dual platelet therapy, to take this every day and monitor stools for signs and symptoms of GI bleed. Report to us or PCP any dark tarry stools or marjorie blood in stool. DISPOSITION: The patient is being discharged to home on a low saturated fat, low cholesterol diet. Pt is instructed to abstain from any heavy lifting, straining, stooping or driving for 5 days. Pt is instructed to watch groin site ( if groin access was performed) for bleeding/oozing. If so,pt is instructed to apply firm pressure with clean cloth and call office at [phone]. Pt is instructed to watch for signs of infection which include increasing area of redness around site, fever/hot to touch or purulent drainage. Pt is instructed not to soak in a tub bath for 1 week, but it is okay to shower. Discharge Exam:     Visit Vitals    /87 (BP 1 Location: Left arm, BP Patient Position: At rest)    Pulse 62    Temp 97.1 °F (36.2 °C)    Resp 18    Ht 5' 4\" (1.626 m)    Wt 92.3 kg (203 lb 8 oz)    SpO2 98%    BMI 34.93 kg/m2     General Appearance:  Well developed, well nourished,alert and oriented x 3, and individual in no acute distress. Ears/Nose/Mouth/Throat:   Hearing grossly normal.         Neck: Supple. Chest:   Lungs clear to auscultation bilaterally. Cardiovascular:  Regular rate and rhythm, S1, S2 normal, no murmur. Abdomen:   Soft, non-tender, bowel sounds are active. Extremities: No edema bilaterally. Skin: Warm and dry. Final Laboratory Data:  No results found for this or any previous visit (from the past 24 hour(s)). Disposition: home    Patient Instructions:   Current Discharge Medication List      START taking these medications    Details   atorvastatin (LIPITOR) 80 mg tablet Take 1 Tab by mouth nightly.   Qty: 30 Tab, Refills: 6      ticagrelor (BRILINTA) 90 mg tablet Take 1 Tab by mouth every twelve (12) hours every twelve (12) hours. Qty: 60 Tab, Refills: 11      lisinopril-hydroCHLOROthiazide (PRINZIDE, ZESTORETIC) 10-12.5 mg per tablet Take 1 Tab by mouth daily. Qty: 30 Tab, Refills: 6         CONTINUE these medications which have NOT CHANGED    Details   metoprolol succinate (TOPROL-XL) 50 mg XL tablet Take 1 Tab by mouth daily. Qty: 90 Tab, Refills: 1    Associated Diagnoses: Essential hypertension      traZODone (DESYREL) 100 mg tablet Take 1 Tab by mouth nightly. Qty: 90 Tab, Refills: 0      sertraline (ZOLOFT) 100 mg tablet Take 1 Tab by mouth two (2) times a day. Qty: 180 Tab, Refills: 0      Comp Stocking,Knee,Regular,Sml (T.E.D. ANTI-EMBOLISM STOCKING) misc 1 Package by Does Not Apply route daily. Qty: 2 Package, Refills: 1    Associated Diagnoses: Pedal edema      nitroglycerin (NITROSTAT) 0.4 mg SL tablet 1 Tab by SubLINGual route every five (5) minutes as needed for Chest Pain. Indications: ANGINA  Qty: 25 Tab, Refills: 11      aspirin delayed-release 81 mg tablet Take 81 mg by mouth daily. STOP taking these medications       hydroCHLOROthiazide (HYDRODIURIL) 25 mg tablet Comments:   Reason for Stopping:         meloxicam (MOBIC) 15 mg tablet Comments:   Reason for Stopping:         simvastatin (ZOCOR) 20 mg tablet Comments:   Reason for Stopping:                 Referenced discharge instructions provided by nursing for diet and activity. Follow-up:  Primary Cardiologist:DR Shelly Alarcon  PCP: Víctor Hernandez NP) in about 4 weeks.     Signed:  Ana Montes NP  1/3/2018  7:34 AM

## 2018-01-03 NOTE — PROGRESS NOTES
Cardiac Rehab:  Spoke with patient regarding referral to cardiac rehab. Patient meets admission criteria based on STEMI/PCI(date1/1/18). Discussed lifestyle modifications to promote cardiac wellness. Patient indicated that she wants to participate in cardiac rehab program.  Ms Roa Later would like to be referred to CR at [facility] as it is closer to her home. Cardiologist is Dr Jessica Chaudhry.

## 2018-01-03 NOTE — DISCHARGE INSTRUCTIONS
Left Heart Catheterization: About This Test  What is it? Cardiac catheterization is a test to check the left side of your heart. Your doctor might look at the shape of your heart, the motion of your heart, or the blood pressure inside the chambers. Why is this test done? This test gives information about how your heart is working. It can:  · Check blood flow and blood pressure in the chambers of the heart. · Check the pumping action of the heart. · Find out if a heart defect is present and how severe it is. · Find out how well the heart valves work. What happens during the test?  · You will get medicine to help you relax. · A thin tube called a catheter is put into a blood vessel in the groin or the arm. The doctor moves the catheter through the blood vessel into your heart. · You will get a shot to numb the skin where the catheter goes in. You may feel pressure when the doctor moves the catheter through your blood vessel into your heart. · Dye may be injected into your heart. Your doctor can watch on special monitors as the dye moves in your heart. The dye helps your doctor see blood flow in your heart. · You may feel hot or flushed for several seconds when the dye is put in.  · If a heart defect is found, cardiac catheterization sometimes is used to correct it during the test.  How long does it take? · The test will take about 30 minutes. If a problem is found and the doctor treats it, it can take a few hours longer. What happens after the test?  · You will stay in a room for at least a few hours to make sure the catheter site starts to heal. You may have a bandage or a compression device on your groin or arm to prevent bleeding. · If the catheter was placed in your groin, you may lie in bed for a few hours. If the catheter was put in your arm, you will need to keep your arm still for at least 1 hour. · You may or may not need to stay in the hospital overnight.  You will get more instructions for what to do at home. · Drink plenty of fluids for several hours after the test.  Follow-up care is a key part of your treatment and safety. Be sure to make and go to all appointments, and call your doctor if you are having problems. It's also a good idea to know your test results and keep a list of the medicines you take. Where can you learn more? Go to http://bryant-seven.info/. Enter W306 in the search box to learn more about \"Left Heart Catheterization: About This Test.\"  Current as of: September 21, 2016  Content Version: 11.4  © 6039-6253 [email or web address]. Care instructions adapted under license by [employer] (which disclaims liability or warranty for this information). If you have questions about a medical condition or this instruction, always ask your healthcare professional. [address] any warranty or liability for your use of this information. A Healthy Heart: Care Instructions  Your Care Instructions    Heart disease occurs when a substance called plaque builds up in the vessels that supply oxygen-rich blood to your heart. This can narrow the blood vessels and reduce blood flow. A heart attack happens when blood flow is completely blocked. A high-fat diet, smoking, and other factors increase the risk of heart disease. Your doctor has found that you have a chance of having heart disease. You can do lots of things to keep your heart healthy. It may not be easy, but you can change your diet, exercise more, and quit smoking. These steps really work to lower your chance of heart disease. Follow-up care is a key part of your treatment and safety. Be sure to make and go to all appointments, and call your doctor if you are having problems. It's also a good idea to know your test results and keep a list of the medicines you take. How can you care for yourself at home? Diet  ? · Use less salt when you cook and eat.  This helps lower your blood pressure. Taste food before salting. Add only a little salt when you think you need it. With time, your taste buds will adjust to less salt. ? · Eat fewer snack items, fast foods, canned soups, and other high-salt, high-fat, processed foods. ? · Read food labels and try to avoid saturated and trans fats. They increase your risk of heart disease by raising cholesterol levels. ? · Limit the amount of solid fat-butter, margarine, and shortening-you eat. Use olive, peanut, or canola oil when you cook. Bake, broil, and steam foods instead of frying them. ? · Eating fish can lower your risk for heart disease. Eat at least 2 servings of fish a week. [place], mackerel, herring, sardines, and chunk light tuna are very good choices. These fish contain omega-3 fatty acids. ? · Eat a variety of fruit and vegetables every day. Dark green, deep orange, red, or yellow fruits and vegetables are especially good for you. Examples include spinach, carrots, peaches, and berries. ? · Foods high in fiber can reduce your cholesterol and provide important vitamins and minerals. High-fiber foods include whole-grain cereals and breads, oatmeal, beans, brown rice, citrus fruits, and apples. ? · Limit drinks and foods with added sugar. These include candy, desserts, and soda pop. ? Lifestyle changes  ? · If your doctor recommends it, get more exercise. Walking is a good choice. Bit by bit, increase the amount you walk every day. Try for at least 30 minutes on most days of the week. You also may want to swim, bike, or do other activities. ? · Do not smoke. If you need help quitting, talk to your doctor about stop-smoking programs and medicines. These can increase your chances of quitting for good. Quitting smoking may be the most important step you can take to protect your heart. It is never too late to quit. You will get health benefits right away. ? · Limit alcohol to 2 drinks a day for men and 1 drink a day for women. Too much alcohol can cause health problems. Medicines  ? · Take your medicines exactly as prescribed. Call your doctor if you think you are having a problem with your medicine. ? · If your doctor recommends aspirin, take the amount directed each day. Make sure you take aspirin and not another kind of pain reliever, such as acetaminophen (Tylenol). If you take ibuprofen (such as Advil or Motrin) for other problems, take aspirin at least 2 hours before taking ibuprofen. When should you call for help? Call 911 if you have symptoms of a heart attack. These may include:  ? · Chest pain or pressure, or a strange feeling in the chest.   ? · Sweating. ? · Shortness of breath. ? · Pain, pressure, or a strange feeling in the back, neck, jaw, or upper belly or in one or both shoulders or arms. ? · Lightheadedness or sudden weakness. ? · A fast or irregular heartbeat. ? After you call 911, the  may tell you to chew 1 adult-strength or 2 to 4 low-dose aspirin. Wait for an ambulance. Do not try to drive yourself. ? Watch closely for changes in your health, and be sure to contact your doctor if you have any problems. Where can you learn more? Go to http://bryant-seven.info/. Enter S200 in the search box to learn more about \"A Healthy Heart: Care Instructions. \"  Current as of: September 21, 2016  Content Version: 11.4  © 0886-1879 [employer]. Care instructions adapted under license by [employer] (which disclaims liability or warranty for this information). If you have questions about a medical condition or this instruction, always ask your healthcare professional. [place] [address] any warranty or liability for your use of this information. Low Sodium Diet (2,000 Milligram): Care Instructions  Your Care Instructions    Too much sodium causes your body to hold on to extra water.  This can raise your blood pressure and force your heart and kidneys to work harder. In very serious cases, this could cause you to be put in the hospital. It might even be life-threatening. By limiting sodium, you will feel better and lower your risk of serious problems. The most common source of sodium is salt. People get most of the salt in their diet from canned, prepared, and packaged foods. Fast food and restaurant meals also are very high in sodium. Your doctor will probably limit your sodium to less than 2,000 milligrams (mg) a day. This limit counts all the sodium in prepared and packaged foods and any salt you add to your food. Follow-up care is a key part of your treatment and safety. Be sure to make and go to all appointments, and call your doctor if you are having problems. It's also a good idea to know your test results and keep a list of the medicines you take. How can you care for yourself at home? Read food labels  · Read labels on cans and food packages. The labels tell you how much sodium is in each serving. Make sure that you look at the serving size. If you eat more than the serving size, you have eaten more sodium. · Food labels also tell you the Percent Daily Value for sodium. Choose products with low Percent Daily Values for sodium. · Be aware that sodium can come in forms other than salt, including monosodium glutamate (MSG), sodium citrate, and sodium bicarbonate (baking soda). MSG is often added to Asian food. When you eat out, you can sometimes ask for food without MSG or added salt. Buy low-sodium foods  · Buy foods that are labeled \"unsalted\" (no salt added), \"sodium-free\" (less than 5 mg of sodium per serving), or \"low-sodium\" (less than 140 mg of sodium per serving). Foods labeled \"reduced-sodium\" and \"light sodium\" may still have too much sodium. Be sure to read the label to see how much sodium you are getting. · Buy fresh vegetables, or frozen vegetables without added sauces.  Buy low-sodium versions of canned vegetables, soups, and other canned goods. Prepare low-sodium meals  · Cut back on the amount of salt you use in cooking. This will help you adjust to the taste. Do not add salt after cooking. One teaspoon of salt has about 2,300 mg of sodium. · Take the salt shaker off the table. · Flavor your food with garlic, lemon juice, onion, vinegar, herbs, and spices. Do not use soy sauce, lite soy sauce, steak sauce, onion salt, garlic salt, celery salt, mustard, or ketchup on your food. · Use low-sodium salad dressings, sauces, and ketchup. Or make your own salad dressings and sauces without adding salt. · Use less salt (or none) when recipes call for it. You can often use half the salt a recipe calls for without losing flavor. Other foods such as rice, pasta, and grains do not need added salt. · Rinse canned vegetables, and cook them in fresh water. This removes some-but not all-of the salt. · Avoid water that is naturally high in sodium or that has been treated with water softeners, which add sodium. Call your local water company to find out the sodium content of your water supply. If you buy bottled water, read the label and choose a sodium-free brand. Avoid high-sodium foods  · Avoid eating:  ¨ Smoked, cured, salted, and canned meat, fish, and poultry. ¨ Ham, gomez, hot dogs, and luncheon meats. ¨ Regular, hard, and processed cheese and regular peanut butter. ¨ Crackers with salted tops, and other salted snack foods such as pretzels, chips, and salted popcorn. ¨ Frozen prepared meals, unless labeled low-sodium. ¨ Canned and dried soups, broths, and bouillon, unless labeled sodium-free or low-sodium. ¨ Canned vegetables, unless labeled sodium-free or low-sodium. ¨ [employer] fries, pizza, tacos, and other fast foods. ¨ Pickles, olives, ketchup, and other condiments, especially soy sauce, unless labeled sodium-free or low-sodium. Where can you learn more? Go to http://bryant-seven.info/.   Enter W144 in the search box to learn more about \"Low Sodium Diet (2,000 Milligram): Care Instructions. \"  Current as of: May 12, 2017  Content Version: 11.4  © 0045-5817 [employer]. Care instructions adapted under license by [employer] (which disclaims liability or warranty for this information). If you have questions about a medical condition or this instruction, always ask your healthcare professional. [place] [address] any warranty or liability for your use of this information. DISCHARGE SUMMARY from Nurse    PATIENT INSTRUCTIONS:    After general anesthesia or intravenous sedation, for 24 hours or while taking prescription Narcotics:  · Limit your activities  · Do not drive and operate hazardous machinery  · Do not make important personal or business decisions  · Do  not drink alcoholic beverages  · If you have not urinated within 8 hours after discharge, please contact your surgeon on call. Report the following to your surgeon:  · Excessive pain, swelling, redness or odor of or around the surgical area  · Temperature over 100.5  · Nausea and vomiting lasting longer than 4 hours or if unable to take medications  · Any signs of decreased circulation or nerve impairment to extremity: change in color, persistent  numbness, tingling, coldness or increase pain  · Any questions    What to do at Home:  Recommended activity: Stay on a low saturated fat, low cholesterol diet. Abstain from any heavy lifting, straining, stooping or driving for 5 days. Watch groin site for bleeding/oozing. If so, apply firm pressure with clean cloth and call office at [phone]. Watch for signs of infection which include increasing area of redness around site, fever/hot to touch or purulent drainage. Do not soak in a tub bath for 1 week, but you may shower. *  Please give a list of your current medications to your Primary Care Provider.     *  Please update this list whenever your medications are discontinued, doses are      changed, or new medications (including over-the-counter products) are added. *  Please carry medication information at all times in case of emergency situations. These are general instructions for a healthy lifestyle:    No smoking/ No tobacco products/ Avoid exposure to second hand smoke  Surgeon General's Warning:  Quitting smoking now greatly reduces serious risk to your health. Obesity, smoking, and sedentary lifestyle greatly increases your risk for illness    A healthy diet, regular physical exercise & weight monitoring are important for maintaining a healthy lifestyle    You may be retaining fluid if you have a history of heart failure or if you experience any of the following symptoms:  Weight gain of 3 pounds or more overnight or 5 pounds in a week, increased swelling in our hands or feet or shortness of breath while lying flat in bed. Please call your doctor as soon as you notice any of these symptoms; do not wait until your next office visit. Recognize signs and symptoms of STROKE:    F-face looks uneven    A-arms unable to move or move unevenly    S-speech slurred or non-existent    T-time-call 911 as soon as signs and symptoms begin-DO NOT go       Back to bed or wait to see if you get better-TIME IS BRAIN. Warning Signs of HEART ATTACK     Call 911 if you have these symptoms:   Chest discomfort. Most heart attacks involve discomfort in the center of the chest that lasts more than a few minutes, or that goes away and comes back. It can feel like uncomfortable pressure, squeezing, fullness, or pain.  Discomfort in other areas of the upper body. Symptoms can include pain or discomfort in one or both arms, the back, neck, jaw, or stomach.  Shortness of breath with or without chest discomfort.  Other signs may include breaking out in a cold sweat, nausea, or lightheadedness. Don't wait more than five minutes to call 911 - MINUTES MATTER!  Fast action can save your life. Calling 911 is almost always the fastest way to get lifesaving treatment. Emergency Medical Services staff can begin treatment when they arrive -- up to an hour sooner than if someone gets to the hospital by car. The discharge information has been reviewed with the patient. The patient verbalized understanding. Discharge medications reviewed with the patient and appropriate educational materials and side effects teaching were provided.   ___________________________________________________________________________________________________________________________________

## 2018-01-03 NOTE — PROGRESS NOTES
Bedside and Verbal shift change report given to self (oncoming nurse) by Talita Ivory RN (offgoing nurse). Report included the following information SBAR, Kardex, Intake/Output, MAR and Recent Results.

## 2018-08-05 PROBLEM — F06.4 ANXIETY DISORDER DUE TO GENERAL MEDICAL CONDITION: Status: ACTIVE | Noted: 2018-08-05

## 2018-11-01 PROBLEM — Z01.810 PREOP CARDIOVASCULAR EXAM: Status: ACTIVE | Noted: 2018-11-01

## 2019-01-28 ENCOUNTER — HOSPITAL ENCOUNTER (OUTPATIENT)
Dept: PHYSICAL THERAPY | Age: 59
Discharge: HOME | End: 2019-01-28
Payer: COMMERCIAL

## 2019-01-28 ENCOUNTER — HOSPITAL ENCOUNTER (OUTPATIENT)
Dept: SURGERY | Age: 59
Discharge: HOME | End: 2019-01-28
Payer: COMMERCIAL

## 2019-01-28 VITALS
OXYGEN SATURATION: 97 % | HEART RATE: 61 BPM | RESPIRATION RATE: 16 BRPM | WEIGHT: 179.1 LBS | TEMPERATURE: 96.1 F | DIASTOLIC BLOOD PRESSURE: 82 MMHG | HEIGHT: 64 IN | SYSTOLIC BLOOD PRESSURE: 141 MMHG | BODY MASS INDEX: 30.58 KG/M2

## 2019-01-28 LAB
ANION GAP SERPL CALC-SCNC: 9 MMOL/L
APPEARANCE UR: CLEAR
APTT PPP: 28.1 SEC (ref 24.7–39.8)
ATRIAL RATE: 57 BPM
BACTERIA SPEC CULT: NORMAL
BASOPHILS # BLD: 0 K/UL (ref 0–0.2)
BASOPHILS NFR BLD: 1 % (ref 0–2)
BILIRUB UR QL: NEGATIVE
BUN SERPL-MCNC: 9 MG/DL (ref 6–23)
CALCIUM SERPL-MCNC: 8.7 MG/DL (ref 8.3–10.4)
CALCULATED P AXIS, ECG09: 53 DEGREES
CALCULATED R AXIS, ECG10: 57 DEGREES
CALCULATED T AXIS, ECG11: 54 DEGREES
CHLORIDE SERPL-SCNC: 106 MMOL/L (ref 98–107)
CO2 SERPL-SCNC: 27 MMOL/L (ref 21–32)
COLOR UR: YELLOW
CREAT SERPL-MCNC: 0.74 MG/DL (ref 0.6–1)
DIAGNOSIS, 93000: NORMAL
DIFFERENTIAL METHOD BLD: ABNORMAL
EOSINOPHIL # BLD: 0.1 K/UL (ref 0–0.8)
EOSINOPHIL NFR BLD: 1 % (ref 0.5–7.8)
ERYTHROCYTE [DISTWIDTH] IN BLOOD BY AUTOMATED COUNT: 14.5 % (ref 11.9–14.6)
GLUCOSE SERPL-MCNC: 87 MG/DL (ref 65–100)
GLUCOSE UR STRIP.AUTO-MCNC: NEGATIVE MG/DL
HCT VFR BLD AUTO: 37.3 % (ref 35.8–46.3)
HGB BLD-MCNC: 11.6 G/DL (ref 11.7–15.4)
HGB UR QL STRIP: NEGATIVE
IMM GRANULOCYTES # BLD AUTO: 0 K/UL (ref 0–0.5)
IMM GRANULOCYTES NFR BLD AUTO: 0 % (ref 0–5)
INR PPP: 1
KETONES UR QL STRIP.AUTO: NEGATIVE MG/DL
LEUKOCYTE ESTERASE UR QL STRIP.AUTO: NEGATIVE
LYMPHOCYTES # BLD: 2.6 K/UL (ref 0.5–4.6)
LYMPHOCYTES NFR BLD: 39 % (ref 13–44)
MCH RBC QN AUTO: 26.5 PG (ref 26.1–32.9)
MCHC RBC AUTO-ENTMCNC: 31.1 G/DL (ref 31.4–35)
MCV RBC AUTO: 85.4 FL (ref 79.6–97.8)
MONOCYTES # BLD: 0.6 K/UL (ref 0.1–1.3)
MONOCYTES NFR BLD: 10 % (ref 4–12)
NEUTS SEG # BLD: 3.4 K/UL (ref 1.7–8.2)
NEUTS SEG NFR BLD: 50 % (ref 43–78)
NITRITE UR QL STRIP.AUTO: NEGATIVE
NRBC # BLD: 0 K/UL (ref 0–0.2)
P-R INTERVAL, ECG05: 126 MS
PH UR STRIP: 5.5 [PH] (ref 5–9)
PLATELET # BLD AUTO: 292 K/UL (ref 150–450)
PMV BLD AUTO: 10.1 FL (ref 9.4–12.3)
POTASSIUM SERPL-SCNC: 3.3 MMOL/L (ref 3.5–5.1)
PROT UR STRIP-MCNC: NEGATIVE MG/DL
PROTHROMBIN TIME: 13 SEC (ref 11.7–14.5)
Q-T INTERVAL, ECG07: 488 MS
QRS DURATION, ECG06: 88 MS
QTC CALCULATION (BEZET), ECG08: 474 MS
RBC # BLD AUTO: 4.37 M/UL (ref 4.05–5.2)
SERVICE CMNT-IMP: NORMAL
SODIUM SERPL-SCNC: 142 MMOL/L (ref 136–145)
SP GR UR REFRACTOMETRY: 1.01 (ref 1–1.02)
UROBILINOGEN UR QL STRIP.AUTO: 1 EU/DL (ref 0.2–1)
VENTRICULAR RATE, ECG03: 57 BPM
WBC # BLD AUTO: 6.7 K/UL (ref 4.3–11.1)

## 2019-01-28 PROCEDURE — 36415 COLL VENOUS BLD VENIPUNCTURE: CPT

## 2019-01-28 PROCEDURE — 77030027138 HC INCENT SPIROMETER -A

## 2019-01-28 PROCEDURE — 85610 PROTHROMBIN TIME: CPT

## 2019-01-28 PROCEDURE — 93005 ELECTROCARDIOGRAM TRACING: CPT | Performed by: ANESTHESIOLOGY

## 2019-01-28 PROCEDURE — 85730 THROMBOPLASTIN TIME PARTIAL: CPT

## 2019-01-28 PROCEDURE — 87641 MR-STAPH DNA AMP PROBE: CPT

## 2019-01-28 PROCEDURE — 97161 PT EVAL LOW COMPLEX 20 MIN: CPT

## 2019-01-28 PROCEDURE — 80048 BASIC METABOLIC PNL TOTAL CA: CPT

## 2019-01-28 PROCEDURE — 85025 COMPLETE CBC W/AUTO DIFF WBC: CPT

## 2019-01-28 PROCEDURE — 81003 URINALYSIS AUTO W/O SCOPE: CPT

## 2019-01-28 RX ORDER — DEXTROMETHORPHAN HYDROBROMIDE, GUAIFENESIN 5; 100 MG/5ML; MG/5ML
650 LIQUID ORAL
COMMUNITY
End: 2019-11-26 | Stop reason: CLARIF

## 2019-01-28 NOTE — PERIOP NOTES
Patient verified name and . Order for consent was found in EHR and matches case posting; patient verified. right total knee arthroplasty    Type 3 surgery, PAT Joint assessment complete. Labs per surgeon: cbc, bmp, UA, PT, PTT, MRSA nasal swab; results pending. Labs per anesthesia protocol: no additional lab work needed. EKG:Done today- abnormal; will have anesthesia review. EKG from 18 placed on chart for comparison. Last cardiology office note dated 18, Heart cath report dated 18 and Echo report dated 18 placed on chart for anesthesia reference if needed. MRSA/MSSA swab collected; pharmacy to review and dose antibiotic as appropriate. Hibiclens and instructions to return bottle on DOS given per hospital policy. Patient provided with handouts including Guide to Surgery, Pain Management, Hand Hygiene, Blood Transfusion Education, and [facility] Brochure. Patient answered medical/surgical history questions at their best of ability. All prior to admission medications documented in [facility]. Original medication prescription bottle not visualized during patient appointment. Patient instructed to hold all vitamins 7 days prior to surgery and NSAIDS 5 days prior to surgery. Prescription medications to hold: Brilinta. Ok to hold Brilinta for 7 days found in last cardiology office note from Dr. Angie Fair dated 18. Patient instructed to continue previous medications as prescribed prior to surgery and to take the following medications the day of surgery according to anesthesia guidelines with a small sip of water: aspirin, wellbutrin, klonopin if needed, metoprolol and zoloft. Patient teach back successful and patient demonstrates knowledge of instruction.

## 2019-01-28 NOTE — PROGRESS NOTES
[facility] : 1960(58 y.o.) [address] at [place] [address], [address] [facility] [phone] Physical Therapy Prehab Plan of Treatment and Evaluation Summary:2019 ICD-10: Treatment Diagnosis:  
· Pain in Right Knee (M25.561) · Stiffness of Right Knee, Not elsewhere classified (M25.661) · Difficulty in walking, Not elsewhere classified (R26.2) Precautions/Allergies:  
Bactrim [sulfamethoprim ds]; Codeine; and Sulfa (sulfonamide antibiotics)  MEDICAL/REFERRING DIAGNOSIS: 
Unilateral primary osteoarthritis, right knee [M17.11] REFERRING PHYSICIAN: Albino Boogie MD 
DATE OF SURGERY: 19 Assessment:  
Comments:  Pt. Plans to go home with support from fiance and son. She reports needing a left tka at some point. PROBLEM LIST (Impacting functional limitations): Ms. Kilgore Age presents with the following right lower extremity(s) problems: 1. Strength 2. Range of Motion 3. Home Exercise Program 
4. Pain INTERVENTIONS PLANNED: 
1. Home Exercise Program 
2. Educational Discussion TREATMENT PLAN: Effective Dates: 2019 TO 2019. Frequency/Duration: Patient to continue to perform home exercise program at least twice per day up until her surgery. GOALS: (Goals have been discussed and agreed upon with patient.) Discharge Goals: Time Frame: 1 Day 1. Patient will demonstrate independence with a home exercise program designed to increase strength, range of motion and pain control to minimize functional deficits and optimize patient for total joint replacement. Rehabilitation Potential For Stated Goals: Good Regarding [employer] therapy, I certify that the treatment plan above will be carried out by a therapist or under their direction. Thank you for this referral, 
Leopold Coombes, PT     
    
 
 
HISTORY:  
Present Symptoms: 
Pain Intensity 1: (5 at worst) Pain Location 1: Knee History of Present Injury/Illness (Reason for Referral): 
Medical/Referring Diagnosis: Unilateral primary osteoarthritis, right knee [M17.11] Past Medical History/Comorbidities: Ms. Jame Monahan  has a past medical history of Anxiety, Arthritis, Atherosclerosis of native coronary artery of native heart without angina pectoris (2016), Depression, GERD (gastroesophageal reflux disease), Hypercholesterolemia, Hypertension, MI (myocardial infarction) ([facility] (2018), Sleep apnea, and [facility] Ms. Jame Monahan  has a past surgical history that includes hx breast biopsy; hx hysterectomy; hx  section (); hx  section (); hx tubal ligation (); hx heart catheterization (2018); and hx colonoscopy (). Social History/Living Environment:  
Home Environment: Private residence # Steps to Enter: 3 Rails to Enter: Yes Hand Rails : Right One/Two Story Residence: One story Living Alone: No 
Support Systems: Spouse/Significant Other/Partner, Child(daniel) Patient Expects to be Discharged to[de-identified] Private residence Current DME Used/Available at Home: Cane, straight Tub or Shower Type: Tub/Shower combination Work/Activity: 
Heavy activity, assemble sprinkler parts Dominant Side: 
RIGHT Current Medications:  See Pre-assessment nursing note Number of Personal Factors/Comorbidities that affect the Plan of Care: 1-2: MODERATE COMPLEXITY EXAMINATION:  
ADLs (Current Functional Status):  
Ambulation: 
[] Independent 
[] Walk Indoors Only 
[] Walk Outdoors [x] Use Assistive Device [] Use Wheelchair Only Dressing: 
[x] Independent Requires Assistance from Someone for: 
[] Sock/Shoes 
[] Pants 
[] Everything Bathing/Showering:  
[x] Independent 
[] Requires Assistance from Someone 
[] Sponge Bath Only Household Activities: 
[] Routine house and yard work [x] Light Housework Only 
[] None Observation/Orthostatic Postural Assessment:  
   
ROM/Flexibility: Gross Assessment: Yes AROM: Within functional limits(left LE) RLE Assessment RLE Assessment (WDL): Exceptions to WDL 
RLE AROM 
R Knee Flexion: 130 R Knee Extension: 2 Strength:  
Gross Assessment: Yes 
Strength: Generally decreased, functional(left LE) RLE Strength R Knee Flexion: 4 
R Knee Extension: 4 Functional Mobility:   
Gross Assessment: Yes 
 
Gait Description (WDL): Exceptions to [facility] Stand to Sit: Additional time, Independent Sit to Stand: Independent, Additional time Distance (ft): 250 Feet (ft) Ambulation - Level of Assistance: Independent Speed/Alicia: Delayed Stance: Right decreased Gait Abnormalities: Antalgic Balance:   
Sitting: Intact Standing: Intact Body Structures Involved: 1. Bones 2. Joints 3. Muscles 4. Ligaments Body Functions Affected: 1. Movement Related Activities and Participation Affected: 1. Mobility Number of elements that affect the Plan of Care: 3: MODERATE COMPLEXITY CLINICAL PRESENTATION:  
Presentation: Stable and uncomplicated: LOW COMPLEXITY CLINICAL DECISION MAKING:  
Outcome Measure: Tool Used: Lower Extremity Functional Scale (LEFS) Score:  Initial: 45/80 Most Recent: X/80 (Date: -- ) Interpretation of Score: 20 questions each scored on a 5 point scale with 0 representing \"extreme difficulty or unable to perform\" and 4 representing \"no difficulty\". The lower the score, the greater the functional disability. 80/80 represents no disability. Minimal detectable change is 9 points. Medical Necessity:  
· Ms. Gonzalez is expected to optimize her lower extremity strength and ROM in preparation for joint replacement surgery. Reason for Services/Other Comments: · Achieve baseline assesment of musculoskeletal system, functional mobility and home environment. , educate in PT HEP in preparation for surgery, educate in hospital plan of care. Use of outcome tool(s) and clinical judgement create a POC that gives a: Clear prediction of patient's progress: LOW COMPLEXITY  
TREATMENT:  
Treatment/Session Assessment:  Patient was instructed in PT- HEP to increase strength and ROM in LEs. Answered all questions. · Post session pain:  Knee pain · Compliance with Program/Exercises: compliant most of the time. Total Treatment Duration: PT Patient Time In/Time Out Time In: 0745 Time Out: 0800 Angel Laurent PT

## 2019-01-28 NOTE — ADVANCED PRACTICE NURSE
Total Joint Surgery Preoperative Chart Review      Patient ID:  Markell Sandoval  [id number]  86 y.o.  1960  Surgeon: Dr. Minoo De La Torre  Date of Surgery: 2019  Procedure: Total Right Knee Arthroplasty  Primary Care Physician: Maria D Guerra [phone]  Specialty Physician(s):      Subjective: Markell Sandoval is a 66 y.o. BLACK OR  female who presents for preoperative evaluation for Total Right Knee arthroplasty. This is a preoperative chart review note based on data collected by the nurse at the surgical Pre-Assessment visit. Past Medical History:   Diagnosis Date    Anxiety     Managed with meds     Arthritis     Atherosclerosis of native coronary artery of native heart without angina pectoris 2016    Obstructive 1-vessel coronary artery disease involving the mid RCA. Successful PCI and stenting of the mid RCA with a 3.0 x 28 mm Synergy drug-eluting stent.  Depression     Managed with meds     GERD (gastroesophageal reflux disease)     Managed with OTC meds PRN     Hypercholesterolemia     Daily meds     Hypertension     Managed with meds     MI (myocardial infarction) ([address] 2018    Successful PCI and stenting of the mid RCA with a 3.0 x 28 mm Synergy drug-eluting stent. Followed by [facility] cardiology.  Sleep apnea     Pt states resolved. Pt states symptoms were from lisinopril.      Ventricular fibrillation ([address]     Only once during heart cath       Past Surgical History:   Procedure Laterality Date    HX BREAST BIOPSY      right biopsy    HX  SECTION      HX  SECTION      HX COLONOSCOPY  2017    HX HEART CATHETERIZATION  2018    stent placed     HX HYSTERECTOMY      HX TUBAL LIGATION       Family History   Problem Relation Age of Onset    Hypertension Mother     Thyroid Disease Mother     No Known Problems Father     Breast Cancer Neg Hx       Social History     Tobacco Use    Smoking status: Former Smoker     Packs/day: 0.25     Years: 30.00     Pack years: 7.50     Types: Cigarettes     Last attempt to quit: 2015     Years since quittin.0    Smokeless tobacco: Never Used   Substance Use Topics    Alcohol use: No       Prior to Admission medications    Medication Sig Start Date End Date Taking? Authorizing Provider   acetaminophen (TYLENOL ARTHRITIS PAIN) 650 mg TbER Take 650 mg by mouth every eight (8) hours as needed. Yes Provider, Historical   metoprolol succinate (TOPROL-XL) 50 mg XL tablet Take 0.5 Tabs by mouth daily. 19  Yes Arun JOE NP   losartan (COZAAR) 25 mg tablet Take 1 Tab by mouth daily. 19  Yes Arun Dunn NP   ergocalciferol (ERGOCALCIFEROL) 50,000 unit capsule Take 1 Cap by mouth every seven (7) days. Patient taking differently: Take 50,000 Units by mouth every seven (7) days. 19  Yes Arun Dunn NP   atorvastatin (LIPITOR) 80 mg tablet TAKE ONE TABLET BY MOUTH EVERY EVENING 19  Yes Arun JOE NP   aspirin delayed-release 81 mg tablet Take 1 Tab by mouth daily. 19  Yes Arun JOE NP   hydroCHLOROthiazide (HYDRODIURIL) 12.5 mg tablet Take 1 Tab by mouth daily. 19  Yes Arun Dunn NP   ticagrelor (BRILINTA) 90 mg tablet Take 1 Tab by mouth every twelve (12) hours every twelve (12) hours. 18  Yes Rosemary Hill MD   traZODone (DESYREL) 100 mg tablet Take 1 Tab by mouth nightly. 18  Yes Esequiel Aguilar NP   sertraline (ZOLOFT) 100 mg tablet Take 1 Tab by mouth two (2) times a day. 18  Yes Esequiel Aguilar NP   buPROPion XL (WELLBUTRIN XL) 150 mg tablet Take 1 Tab by mouth every morning. 18  Yes Esequiel Aguilar NP   clonazePAM (KLONOPIN) 0.5 mg tablet Take 1 Tab by mouth two (2) times daily as needed.  Max Daily Amount: 1 mg. 18  Yes Esequiel Aguilar, NP   nitroglycerin (NITROSTAT) 0.4 mg SL tablet 1 Tab by SubLINGual route every five (5) minutes as needed for Chest Pain. Indications: Angina 1/16/18  Yes Lucetta Clipper, NP   Comp Stocking,Knee,Regular,Sml (T.E.D. ANTI-EMBOLISM STOCKING) misc 1 Package by Does Not Apply route daily.  5/22/17  Yes Lucetta Clipper, NP     Allergies   Allergen Reactions    Bactrim [Sulfamethoprim Ds] Rash    Codeine Itching    Sulfa (Sulfonamide Antibiotics) Hives          Objective:     Physical Exam:   Patient Vitals for the past 24 hrs:   Temp Pulse Resp BP SpO2   01/28/19 0906 96.1 °F (35.6 °C) 61 16 141/82 97 %       ECG:    EKG Results     Procedure 720 Value Units Date/Time    EKG, 12 LEAD, INITIAL [[id number]] Collected:  01/28/19 0942    Order Status:  Completed Updated:  01/28/19 1015     Ventricular Rate 57 BPM      Atrial Rate 57 BPM      P-R Interval 126 ms      QRS Duration 88 ms      Q-T Interval 488 ms      QTC Calculation (Bezet) 474 ms      Calculated P Axis 53 degrees      Calculated R Axis 57 degrees      Calculated T Axis 54 degrees      Diagnosis --     Sinus bradycardia  T wave abnormality, consider anterior ischemia  Prolonged QT  Abnormal ECG  When compared with ECG of 02-JAN-2018 04:29,  T wave inversion no longer evident in Inferior leads  T wave inversion no longer evident in Lateral leads  Confirmed by JENNIFER SHORE (), Afshan Mckeon ([id number]) on 1/28/2019 10:14:58 AM            Data Review:   Labs:   Recent Results (from the past 24 hour(s))   CBC WITH AUTOMATED DIFF    Collection Time: 01/28/19  7:50 AM   Result Value Ref Range    WBC 6.7 4.3 - 11.1 K/uL    RBC 4.37 4.05 - 5.2 M/uL    HGB 11.6 (L) 11.7 - 15.4 g/dL    HCT 37.3 35.8 - 46.3 %    MCV 85.4 79.6 - 97.8 FL    MCH 26.5 26.1 - 32.9 PG    MCHC 31.1 (L) 31.4 - 35.0 g/dL    RDW 14.5 11.9 - 14.6 %    PLATELET 920 311 - 890 K/uL    MPV 10.1 9.4 - 12.3 FL    ABSOLUTE NRBC 0.00 0.0 - 0.2 K/uL    DF AUTOMATED      NEUTROPHILS 50 43 - 78 %    LYMPHOCYTES 39 13 - 44 %    MONOCYTES 10 4.0 - 12.0 %    EOSINOPHILS 1 0.5 - 7.8 %    BASOPHILS 1 0.0 - 2.0 %    IMMATURE GRANULOCYTES 0 0.0 - 5.0 %    ABS. NEUTROPHILS 3.4 1.7 - 8.2 K/UL    ABS. LYMPHOCYTES 2.6 0.5 - 4.6 K/UL    ABS. MONOCYTES 0.6 0.1 - 1.3 K/UL    ABS. EOSINOPHILS 0.1 0.0 - 0.8 K/UL    ABS. BASOPHILS 0.0 0.0 - 0.2 K/UL    ABS. IMM. GRANS. 0.0 0.0 - 0.5 K/UL   METABOLIC PANEL, BASIC    Collection Time: 01/28/19  7:50 AM   Result Value Ref Range    Sodium 142 136 - 145 mmol/L    Potassium 3.3 (L) 3.5 - 5.1 mmol/L    Chloride 106 98 - 107 mmol/L    CO2 27 21 - 32 mmol/L    Anion gap 9 mmol/L    Glucose 87 65 - 100 mg/dL    BUN 9 6 - 23 MG/DL    Creatinine 0.74 0.6 - 1.0 MG/DL    GFR est AA >60 >60 ml/min/1.73m2    GFR est non-AA >60 ml/min/1.73m2    Calcium 8.7 8.3 - 10.4 MG/DL   PROTHROMBIN TIME + INR    Collection Time: 01/28/19  7:50 AM   Result Value Ref Range    Prothrombin time 13.0 11.7 - 14.5 sec    INR 1.0     PTT    Collection Time: 01/28/19  7:50 AM   Result Value Ref Range    aPTT 28.1 24.7 - 39.8 SEC   URINALYSIS W/ RFLX MICROSCOPIC    Collection Time: 01/28/19  7:50 AM   Result Value Ref Range    Color YELLOW      Appearance CLEAR      Specific gravity 1.013 1.001 - 1.023      pH (UA) 5.5 5.0 - 9.0      Protein NEGATIVE  NEG mg/dL    Glucose NEGATIVE  mg/dL    Ketone NEGATIVE  NEG mg/dL    Bilirubin NEGATIVE  NEG      Blood NEGATIVE  NEG      Urobilinogen 1.0 0.2 - 1.0 EU/dL    Nitrites NEGATIVE  NEG      Leukocyte Esterase NEGATIVE  NEG     MSSA/MRSA SC BY PCR, NASAL SWAB    Collection Time: 01/28/19  9:37 AM   Result Value Ref Range    Special Requests: NO SPECIAL REQUESTS      Culture result:        SA target not detected. A MRSA NEGATIVE, SA NEGATIVE test result does not preclude MRSA or SA nasal colonization.    EKG, 12 LEAD, INITIAL    Collection Time: 01/28/19  9:42 AM   Result Value Ref Range    Ventricular Rate 57 BPM    Atrial Rate 57 BPM    P-R Interval 126 ms    QRS Duration 88 ms    Q-T Interval 488 ms    QTC Calculation (Bezet) 474 ms Calculated P Axis 53 degrees    Calculated R Axis 57 degrees    Calculated T Axis 54 degrees    Diagnosis       Sinus bradycardia  T wave abnormality, consider anterior ischemia  Prolonged QT  Abnormal ECG  When compared with ECG of 02-JAN-2018 04:29,  T wave inversion no longer evident in Inferior leads  T wave inversion no longer evident in Lateral leads  Confirmed by JENNIFER SHORE (), Kiesha Evans ([id number]) on 1/28/2019 10:14:58 AM           Problem List:  )  Patient Active Problem List   Diagnosis Code    Hypertension I10    Hypercholesterolemia E78.00    Depression F32.9    Abnormal cardiovascular stress test R94.39    Atherosclerosis of native coronary artery of native heart without angina pectoris I25.10    Insomnia G47.00    Recurrent major depressive disorder (HCC) F33.9    Generalized anxiety disorder F41.1    Abnormal mammogram R92.8    Visit for screening mammogram Z12.31    ST elevation myocardial infarction (STEMI) of inferior wall (HCC) I21.19    Tobacco use Z72.0    STEMI (ST elevation myocardial infarction) (HCC) I21.3    Ventricular fibrillation (HCC) I49.01    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            Signed By: AUSTIN Pisano    January 28, 2019

## 2019-01-28 NOTE — PERIOP NOTES
Lab results within anesthesia guidelines. Lab results sent to PCP per surgeon's request.       Recent Results (from the past 12 hour(s))   CBC WITH AUTOMATED DIFF    Collection Time: 01/28/19  7:50 AM   Result Value Ref Range    WBC 6.7 4.3 - 11.1 K/uL    RBC 4.37 4.05 - 5.2 M/uL    HGB 11.6 (L) 11.7 - 15.4 g/dL    HCT 37.3 35.8 - 46.3 %    MCV 85.4 79.6 - 97.8 FL    MCH 26.5 26.1 - 32.9 PG    MCHC 31.1 (L) 31.4 - 35.0 g/dL    RDW 14.5 11.9 - 14.6 %    PLATELET 588 376 - 679 K/uL    MPV 10.1 9.4 - 12.3 FL    ABSOLUTE NRBC 0.00 0.0 - 0.2 K/uL    DF AUTOMATED      NEUTROPHILS 50 43 - 78 %    LYMPHOCYTES 39 13 - 44 %    MONOCYTES 10 4.0 - 12.0 %    EOSINOPHILS 1 0.5 - 7.8 %    BASOPHILS 1 0.0 - 2.0 %    IMMATURE GRANULOCYTES 0 0.0 - 5.0 %    ABS. NEUTROPHILS 3.4 1.7 - 8.2 K/UL    ABS. LYMPHOCYTES 2.6 0.5 - 4.6 K/UL    ABS. MONOCYTES 0.6 0.1 - 1.3 K/UL    ABS. EOSINOPHILS 0.1 0.0 - 0.8 K/UL    ABS. BASOPHILS 0.0 0.0 - 0.2 K/UL    ABS. IMM.  GRANS. 0.0 0.0 - 0.5 K/UL   METABOLIC PANEL, BASIC    Collection Time: 01/28/19  7:50 AM   Result Value Ref Range    Sodium 142 136 - 145 mmol/L    Potassium 3.3 (L) 3.5 - 5.1 mmol/L    Chloride 106 98 - 107 mmol/L    CO2 27 21 - 32 mmol/L    Anion gap 9 mmol/L    Glucose 87 65 - 100 mg/dL    BUN 9 6 - 23 MG/DL    Creatinine 0.74 0.6 - 1.0 MG/DL    GFR est AA >60 >60 ml/min/1.73m2    GFR est non-AA >60 ml/min/1.73m2    Calcium 8.7 8.3 - 10.4 MG/DL   PROTHROMBIN TIME + INR    Collection Time: 01/28/19  7:50 AM   Result Value Ref Range    Prothrombin time 13.0 11.7 - 14.5 sec    INR 1.0     PTT    Collection Time: 01/28/19  7:50 AM   Result Value Ref Range    aPTT 28.1 24.7 - 39.8 SEC   URINALYSIS W/ RFLX MICROSCOPIC    Collection Time: 01/28/19  7:50 AM   Result Value Ref Range    Color YELLOW      Appearance CLEAR      Specific gravity 1.013 1.001 - 1.023      pH (UA) 5.5 5.0 - 9.0      Protein NEGATIVE  NEG mg/dL    Glucose NEGATIVE  mg/dL    Ketone NEGATIVE  NEG mg/dL    Bilirubin NEGATIVE  NEG      Blood NEGATIVE  NEG      Urobilinogen 1.0 0.2 - 1.0 EU/dL    Nitrites NEGATIVE  NEG      Leukocyte Esterase NEGATIVE  NEG     MSSA/MRSA SC BY PCR, NASAL SWAB    Collection Time: 01/28/19  9:37 AM   Result Value Ref Range    Special Requests: NO SPECIAL REQUESTS      Culture result:        SA target not detected. A MRSA NEGATIVE, SA NEGATIVE test result does not preclude MRSA or SA nasal colonization.    EKG, 12 LEAD, INITIAL    Collection Time: 01/28/19  9:42 AM   Result Value Ref Range    Ventricular Rate 57 BPM    Atrial Rate 57 BPM    P-R Interval 126 ms    QRS Duration 88 ms    Q-T Interval 488 ms    QTC Calculation (Bezet) 474 ms    Calculated P Axis 53 degrees    Calculated R Axis 57 degrees    Calculated T Axis 54 degrees    Diagnosis       Sinus bradycardia  T wave abnormality, consider anterior ischemia  Prolonged QT  Abnormal ECG  When compared with ECG of 02-JAN-2018 04:29,  T wave inversion no longer evident in Inferior leads  T wave inversion no longer evident in Lateral leads  Confirmed by JENNIFER SHORE (), Shlomo Enriquez ([id number]) on 1/28/2019 10:14:58 AM

## 2019-02-15 NOTE — H&P
H&P 
 
Patient ID: DIEGO Uribe [id number] 
66 y.o. 
1960 Surgeon:  Jesus Michael MD 
Date of Surgery: * No surgery date entered * Procedure: Right Knee Total Arthroplasty Primary Care Physician: Leanna Ballesteros NP Subjective: [employer] is a 66 y.o. BLACK OR  female who presents with Right Knee pain. She has history of Right Knee pain for several months ago. Symptoms worse with walking and relieved with rest. Conservative treatment consisting of  therapeutic injections into the knee has not helped. The patient  lives with their family. The patients goal after surgery is improved pain and function. Past Medical History:  
Diagnosis Date  Anxiety Managed with meds  Arthritis  Atherosclerosis of native coronary artery of native heart without angina pectoris 04/21/2016 Obstructive 1-vessel coronary artery disease involving the mid RCA. Successful PCI and stenting of the mid RCA with a 3.0 x 28 mm Synergy drug-eluting stent.  Depression Managed with meds  GERD (gastroesophageal reflux disease) Managed with OTC meds PRN   
 Hypercholesterolemia Daily meds  Hypertension Managed with meds  MI (myocardial infarction) ([address] 01/2018 Successful PCI and stenting of the mid RCA with a 3.0 x 28 mm Synergy drug-eluting stent. Followed by [address] cardiology.  Sleep apnea Pt states resolved. Pt states symptoms were from lisinopril.  Ventricular fibrillation ([address] Only once during heart cath Past Surgical History:  
Procedure Laterality Date  HX BREAST BIOPSY    
 right biopsy [address]  HX COLONOSCOPY  2017  HX HEART CATHETERIZATION  01/01/2018  
 stent placed  HX HYSTERECTOMY [address] Family History Problem Relation Age of Onset  Hypertension Mother  Thyroid Disease Mother  No Known Problems Father  Breast Cancer Neg Hx Social History Tobacco Use  Smoking status: Former Smoker Packs/day: 0.25 Years: 30.00 Pack years: 7.50 Types: Cigarettes Last attempt to quit: 2015 Years since quittin.1  Smokeless tobacco: Former User Substance Use Topics  Alcohol use: No  
   
Prior to Admission medications Medication Sig Start Date End Date Taking? Authorizing Provider  
lidocaine (XYLOCAINE) 5 % ointment  18   Provider, Historical  
traZODone (DESYREL) 100 mg tablet Take 1 Tab by mouth nightly. 19   Madison Oliver MD  
sertraline (ZOLOFT) 100 mg tablet Take 1 Tab by mouth two (2) times a day. 19   Madison Oliver MD  
clonazePAM (KLONOPIN) 0.5 mg tablet Take 1 Tab by mouth two (2) times daily as needed. Max Daily Amount: 1 mg. 19   Madison Oliver MD  
buPROPion XL (WELLBUTRIN XL) 300 mg XL tablet Take 1 Tab by mouth every morning. 19   Madison Oliver MD  
acetaminophen (TYLENOL ARTHRITIS PAIN) 650 mg TbER Take 650 mg by mouth every eight (8) hours as needed. Provider, Historical  
metoprolol succinate (TOPROL-XL) 50 mg XL tablet Take 0.5 Tabs by mouth daily. 19   Jesus JOE NP  
losartan (COZAAR) 25 mg tablet Take 1 Tab by mouth daily. 19   Jesus Needs CORTES JOE  
ergocalciferol (ERGOCALCIFEROL) 50,000 unit capsule Take 1 Cap by mouth every seven (7) days. Patient taking differently: Take 50,000 Units by mouth every seven (7) days. 19   Jesus Needs CORTES JOE  
atorvastatin (LIPITOR) 80 mg tablet TAKE ONE TABLET BY MOUTH EVERY EVENING 19   Jesus JOE NP  
aspirin delayed-release 81 mg tablet Take 1 Tab by mouth daily. 19   Jesus JOE NP  
hydroCHLOROthiazide (HYDRODIURIL) 12.5 mg tablet Take 1 Tab by mouth daily.  19   Jesus JOE NP  
ticagrelor (BRILINTA) 90 mg tablet Take 1 Tab by mouth every twelve (12) hours every twelve (12) hours. 12/31/18   Anil Vega MD  
nitroglycerin (NITROSTAT) 0.4 mg SL tablet 1 Tab by SubLINGual route every five (5) minutes as needed for Chest Pain. Indications: Angina 1/16/18   Brenda Abad NP Comp Stocking,Knee,Regular,Sml (T.E.D. ANTI-EMBOLISM STOCKING) misc 1 Package by Does Not Apply route daily. 5/22/17   Brenda Abad NP Allergies Allergen Reactions  Bactrim [Sulfamethoprim Ds] Rash  Codeine Itching  Sulfa (Sulfonamide Antibiotics) Hives REVIEW OF SYSTEMS: 
CONSTITUTIONAL: Denies fever, decreased appetite, weight loss/gain, night sweats or fatigue. HEENT: Denies vision or hearing changes. has glasses. denies hearing aids. CARDIAC: Denies CP, palpitations, rheumatic fever, murmur, peripheral edema, carotid artery disease or syncopal episodes. RESPIRATORY: Denies dyspnea on exertion, asthma, COPD or orthopnea. GI: Denies GERD, history of GI bleed or melena, PUD, hepatitis or cirrhosis. : Denies dysuria, hematuria. denies BPH symptoms. HEMATOLOGIC: Denies anemia or blood disorders. ENDOCRINE: Denies thyroid disease. MUSCULOSKELETAL: See HPI. NEUROLOGIC: Denies seizure, peripheral neuropathy or memory loss. PSYCH: Denies depression, anxiety or insomnia. SKIN: Denies rash or open sores. Objective: PHYSICAL EXAM 
GENERAL: No data found. EYES: PERRL. EOM intact. MOUTH:Teeth and Gums normal. NECK: Full ROM. Trachea midline. No thyromegaly or JVD. CARDIOVASCULAR: Regular rate and rhythm. No murmur or gallops. No carotid bruits. Peripheral pulses: radial 2 +, PT 2+, DP 2+ bilaterally. LUNGS: CTA bilaterally. No wheezes, rhonchi or rales. GI: positive BS. Abdomen nontender. NEUROLOGIC: Alert and oriented x 3. Bilateral equal strong had grasp and bilateral equal strong plantar flexion and dorsiflexion. GAIT: abnormal  MUSCULOSKELETAL: ROM: full range with pain.  Tenderness: Medial joint line. Crepitus: present. SKIN: No rash, bruising, swelling, redness or warmth. Labs:  No results found for this or any previous visit (from the past 24 hour(s)). Xray Left Knee: Joint space narrowing Assessment: 
Advanced Left Knee Osteoarthritis. Total Left Knee Arthroplasty Indicated. Patient Active Problem List  
Diagnosis Code  Hypertension I10  
 Hypercholesterolemia E78.00  Depression F32.9  Abnormal cardiovascular stress test R94.39  
 Atherosclerosis of native coronary artery of native heart without angina pectoris I25.10  Insomnia G47.00  Recurrent major depressive disorder ([facility] F33.9  Generalized anxiety disorder F41.1  Abnormal mammogram R92.8  Visit for screening mammogram Z12.31  
 ST elevation myocardial infarction (STEMI) of inferior wall (HCC) I21.19  
 Tobacco use Z72.0  
 STEMI (ST elevation myocardial infarction) (HCC) I21.3  Ventricular fibrillation (HCC) I49.01  
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 
I have advised the patient of the risks and consequences, including possible complications of performing total joint replacement, as well as not doing this operation. The patient had the opportunity to ask questions and have them answered to their satisfaction. Signed: 
IZZY Hamlin 2/15/2019

## 2019-02-18 ENCOUNTER — ANESTHESIA EVENT (OUTPATIENT)
Dept: SURGERY | Age: 59
DRG: 470 | End: 2019-02-18
Payer: COMMERCIAL

## 2019-02-19 ENCOUNTER — HOME HEALTH ADMISSION (OUTPATIENT)
Dept: HOME HEALTH SERVICES | Facility: HOME HEALTH | Age: 59
End: 2019-02-19
Payer: COMMERCIAL

## 2019-02-19 ENCOUNTER — ANESTHESIA (OUTPATIENT)
Dept: SURGERY | Age: 59
DRG: 470 | End: 2019-02-19
Payer: COMMERCIAL

## 2019-02-19 ENCOUNTER — HOSPITAL ENCOUNTER (INPATIENT)
Age: 59
LOS: 1 days | Discharge: HOME HEALTH | DRG: 470 | End: 2019-02-20
Attending: ORTHOPAEDIC SURGERY | Admitting: ORTHOPAEDIC SURGERY
Payer: COMMERCIAL

## 2019-02-19 DIAGNOSIS — Z96.651 S/P TKR (TOTAL KNEE REPLACEMENT) USING CEMENT, RIGHT: Primary | ICD-10-CM

## 2019-02-19 PROBLEM — M17.11 ARTHRITIS OF KNEE, RIGHT: Status: ACTIVE | Noted: 2019-02-19

## 2019-02-19 LAB
ABO + RH BLD: NORMAL
BLOOD GROUP ANTIBODIES SERPL: NORMAL
GLUCOSE BLD STRIP.AUTO-MCNC: 104 MG/DL (ref 65–100)
HGB BLD-MCNC: 10.4 G/DL (ref 11.7–15.4)
SPECIMEN EXP DATE BLD: NORMAL

## 2019-02-19 PROCEDURE — 74011250636 HC RX REV CODE- 250/636: Performed by: ORTHOPAEDIC SURGERY

## 2019-02-19 PROCEDURE — 77030018673: Performed by: ORTHOPAEDIC SURGERY

## 2019-02-19 PROCEDURE — 76010010054 HC POST OP PAIN BLOCK: Performed by: ORTHOPAEDIC SURGERY

## 2019-02-19 PROCEDURE — 76060000034 HC ANESTHESIA 1.5 TO 2 HR: Performed by: ORTHOPAEDIC SURGERY

## 2019-02-19 PROCEDURE — 77030018836 HC SOL IRR NACL ICUM -A: Performed by: ORTHOPAEDIC SURGERY

## 2019-02-19 PROCEDURE — 77030020782 HC GWN BAIR PAWS FLX 3M -B: Performed by: ANESTHESIOLOGY

## 2019-02-19 PROCEDURE — 77030036688 HC BLNKT CLD THER S2SG -B

## 2019-02-19 PROCEDURE — C1713 ANCHOR/SCREW BN/BN,TIS/BN: HCPCS | Performed by: ORTHOPAEDIC SURGERY

## 2019-02-19 PROCEDURE — 65270000029 HC RM PRIVATE

## 2019-02-19 PROCEDURE — 76210000016 HC OR PH I REC 1 TO 1.5 HR: Performed by: ORTHOPAEDIC SURGERY

## 2019-02-19 PROCEDURE — 77030034849: Performed by: ORTHOPAEDIC SURGERY

## 2019-02-19 PROCEDURE — 76942 ECHO GUIDE FOR BIOPSY: CPT | Performed by: ORTHOPAEDIC SURGERY

## 2019-02-19 PROCEDURE — 74011250636 HC RX REV CODE- 250/636

## 2019-02-19 PROCEDURE — 74011250637 HC RX REV CODE- 250/637: Performed by: ORTHOPAEDIC SURGERY

## 2019-02-19 PROCEDURE — 94762 N-INVAS EAR/PLS OXIMTRY CONT: CPT

## 2019-02-19 PROCEDURE — 77030032490 HC SLV COMPR SCD KNE COVD -B

## 2019-02-19 PROCEDURE — 77030020263 HC SOL INJ SOD CL0.9% LFCR 1000ML

## 2019-02-19 PROCEDURE — 74011000302 HC RX REV CODE- 302: Performed by: ORTHOPAEDIC SURGERY

## 2019-02-19 PROCEDURE — 77030019557 HC ELECTRD VES SEAL MEDT -F: Performed by: ORTHOPAEDIC SURGERY

## 2019-02-19 PROCEDURE — 74011250636 HC RX REV CODE- 250/636: Performed by: ANESTHESIOLOGY

## 2019-02-19 PROCEDURE — 77030006835 HC BLD SAW SAG STRY -B: Performed by: ORTHOPAEDIC SURGERY

## 2019-02-19 PROCEDURE — 74011000250 HC RX REV CODE- 250: Performed by: ORTHOPAEDIC SURGERY

## 2019-02-19 PROCEDURE — 77030011208: Performed by: ORTHOPAEDIC SURGERY

## 2019-02-19 PROCEDURE — 77030012935 HC DRSG AQUACEL BMS -B: Performed by: ORTHOPAEDIC SURGERY

## 2019-02-19 PROCEDURE — 86900 BLOOD TYPING SEROLOGIC ABO: CPT

## 2019-02-19 PROCEDURE — 74011000258 HC RX REV CODE- 258: Performed by: ORTHOPAEDIC SURGERY

## 2019-02-19 PROCEDURE — 77030008467 HC STPLR SKN COVD -B: Performed by: ORTHOPAEDIC SURGERY

## 2019-02-19 PROCEDURE — 0SRC0J9 REPLACEMENT OF RIGHT KNEE JOINT WITH SYNTHETIC SUBSTITUTE, CEMENTED, OPEN APPROACH: ICD-10-PCS | Performed by: ORTHOPAEDIC SURGERY

## 2019-02-19 PROCEDURE — 77030003665 HC NDL SPN BBMI -A: Performed by: ANESTHESIOLOGY

## 2019-02-19 PROCEDURE — 82962 GLUCOSE BLOOD TEST: CPT

## 2019-02-19 PROCEDURE — 77030013819 HC MX SYS CEM ZIMM -B: Performed by: ORTHOPAEDIC SURGERY

## 2019-02-19 PROCEDURE — 74011000250 HC RX REV CODE- 250

## 2019-02-19 PROCEDURE — 36415 COLL VENOUS BLD VENIPUNCTURE: CPT

## 2019-02-19 PROCEDURE — 85018 HEMOGLOBIN: CPT

## 2019-02-19 PROCEDURE — 77030016544 HC BLD SAW RECIP1 STRY -B: Performed by: ORTHOPAEDIC SURGERY

## 2019-02-19 PROCEDURE — C1776 JOINT DEVICE (IMPLANTABLE): HCPCS | Performed by: ORTHOPAEDIC SURGERY

## 2019-02-19 PROCEDURE — 77030033067 HC SUT PDO STRATFX SPIR J&J -B: Performed by: ORTHOPAEDIC SURGERY

## 2019-02-19 PROCEDURE — 77030003602 HC NDL NRV BLK BBMI -B: Performed by: ANESTHESIOLOGY

## 2019-02-19 PROCEDURE — 77030007880 HC KT SPN EPDRL BBMI -B: Performed by: ANESTHESIOLOGY

## 2019-02-19 PROCEDURE — 77030031139 HC SUT VCRL2 J&J -A: Performed by: ORTHOPAEDIC SURGERY

## 2019-02-19 PROCEDURE — 76010000162 HC OR TIME 1.5 TO 2 HR INTENSV-TIER 1: Performed by: ORTHOPAEDIC SURGERY

## 2019-02-19 PROCEDURE — 86580 TB INTRADERMAL TEST: CPT | Performed by: ORTHOPAEDIC SURGERY

## 2019-02-19 PROCEDURE — 77030020256 HC SOL INJ NACL 0.9%  500ML: Performed by: ORTHOPAEDIC SURGERY

## 2019-02-19 PROCEDURE — 77030013708 HC HNDPC SUC IRR PULS STRY –B: Performed by: ORTHOPAEDIC SURGERY

## 2019-02-19 DEVICE — INSERT TIB SZ 5 THK6MM KNEE POST STBL ROT PLATFRM ATTUNE: Type: IMPLANTABLE DEVICE | Site: KNEE | Status: FUNCTIONAL

## 2019-02-19 DEVICE — COMPONENT PAT DIA38MM POLYETH DOME CEM MEDIALIZED ATTUNE: Type: IMPLANTABLE DEVICE | Site: KNEE | Status: FUNCTIONAL

## 2019-02-19 DEVICE — (D)CEMENT BNE HV R 40GM -- DUPE USE ITEM 353850: Type: IMPLANTABLE DEVICE | Site: KNEE | Status: FUNCTIONAL

## 2019-02-19 DEVICE — COMPONENT FEM SZ 5 R KNEE POST STBL CEM ATTUNE: Type: IMPLANTABLE DEVICE | Site: KNEE | Status: FUNCTIONAL

## 2019-02-19 RX ORDER — ONDANSETRON 8 MG/1
8 TABLET, ORALLY DISINTEGRATING ORAL
Status: DISCONTINUED | OUTPATIENT
Start: 2019-02-19 | End: 2019-02-20 | Stop reason: HOSPADM

## 2019-02-19 RX ORDER — OXYCODONE HYDROCHLORIDE 5 MG/1
5 TABLET ORAL
Status: DISCONTINUED | OUTPATIENT
Start: 2019-02-19 | End: 2019-02-19 | Stop reason: HOSPADM

## 2019-02-19 RX ORDER — ROPIVACAINE HYDROCHLORIDE 2 MG/ML
INJECTION, SOLUTION EPIDURAL; INFILTRATION; PERINEURAL AS NEEDED
Status: DISCONTINUED | OUTPATIENT
Start: 2019-02-19 | End: 2019-02-19 | Stop reason: HOSPADM

## 2019-02-19 RX ORDER — FLUMAZENIL 0.1 MG/ML
0.2 INJECTION INTRAVENOUS AS NEEDED
Status: DISCONTINUED | OUTPATIENT
Start: 2019-02-19 | End: 2019-02-19 | Stop reason: HOSPADM

## 2019-02-19 RX ORDER — ACETAMINOPHEN 500 MG
1000 TABLET ORAL EVERY 6 HOURS
Status: DISCONTINUED | OUTPATIENT
Start: 2019-02-20 | End: 2019-02-20 | Stop reason: HOSPADM

## 2019-02-19 RX ORDER — MIDAZOLAM HYDROCHLORIDE 1 MG/ML
2 INJECTION, SOLUTION INTRAMUSCULAR; INTRAVENOUS
Status: DISCONTINUED | OUTPATIENT
Start: 2019-02-19 | End: 2019-02-19 | Stop reason: HOSPADM

## 2019-02-19 RX ORDER — MIDAZOLAM HYDROCHLORIDE 1 MG/ML
2 INJECTION, SOLUTION INTRAMUSCULAR; INTRAVENOUS ONCE
Status: COMPLETED | OUTPATIENT
Start: 2019-02-19 | End: 2019-02-19

## 2019-02-19 RX ORDER — AMOXICILLIN 250 MG
2 CAPSULE ORAL DAILY
Status: DISCONTINUED | OUTPATIENT
Start: 2019-02-20 | End: 2019-02-20 | Stop reason: HOSPADM

## 2019-02-19 RX ORDER — PROMETHAZINE HYDROCHLORIDE 25 MG/1
25 TABLET ORAL
Status: DISCONTINUED | OUTPATIENT
Start: 2019-02-19 | End: 2019-02-20 | Stop reason: HOSPADM

## 2019-02-19 RX ORDER — DEXAMETHASONE SODIUM PHOSPHATE 100 MG/10ML
INJECTION INTRAMUSCULAR; INTRAVENOUS AS NEEDED
Status: DISCONTINUED | OUTPATIENT
Start: 2019-02-19 | End: 2019-02-19 | Stop reason: HOSPADM

## 2019-02-19 RX ORDER — NALOXONE HYDROCHLORIDE 0.4 MG/ML
.2-.4 INJECTION, SOLUTION INTRAMUSCULAR; INTRAVENOUS; SUBCUTANEOUS
Status: DISCONTINUED | OUTPATIENT
Start: 2019-02-19 | End: 2019-02-20 | Stop reason: HOSPADM

## 2019-02-19 RX ORDER — CEFAZOLIN SODIUM/WATER 2 G/20 ML
2 SYRINGE (ML) INTRAVENOUS EVERY 8 HOURS
Status: COMPLETED | OUTPATIENT
Start: 2019-02-19 | End: 2019-02-20

## 2019-02-19 RX ORDER — FENTANYL CITRATE 50 UG/ML
100 INJECTION, SOLUTION INTRAMUSCULAR; INTRAVENOUS ONCE
Status: COMPLETED | OUTPATIENT
Start: 2019-02-19 | End: 2019-02-19

## 2019-02-19 RX ORDER — OXYCODONE HYDROCHLORIDE 5 MG/1
10 TABLET ORAL
Status: DISCONTINUED | OUTPATIENT
Start: 2019-02-19 | End: 2019-02-20 | Stop reason: HOSPADM

## 2019-02-19 RX ORDER — ATORVASTATIN CALCIUM 40 MG/1
80 TABLET, FILM COATED ORAL
Status: DISCONTINUED | OUTPATIENT
Start: 2019-02-19 | End: 2019-02-20 | Stop reason: HOSPADM

## 2019-02-19 RX ORDER — ASPIRIN 81 MG/1
81 TABLET ORAL EVERY 12 HOURS
Status: DISCONTINUED | OUTPATIENT
Start: 2019-02-19 | End: 2019-02-20 | Stop reason: HOSPADM

## 2019-02-19 RX ORDER — SODIUM CHLORIDE, SODIUM LACTATE, POTASSIUM CHLORIDE, CALCIUM CHLORIDE 600; 310; 30; 20 MG/100ML; MG/100ML; MG/100ML; MG/100ML
75 INJECTION, SOLUTION INTRAVENOUS CONTINUOUS
Status: DISCONTINUED | OUTPATIENT
Start: 2019-02-19 | End: 2019-02-19 | Stop reason: HOSPADM

## 2019-02-19 RX ORDER — DIPHENHYDRAMINE HCL 25 MG
25 CAPSULE ORAL
Status: DISCONTINUED | OUTPATIENT
Start: 2019-02-19 | End: 2019-02-20 | Stop reason: HOSPADM

## 2019-02-19 RX ORDER — SODIUM CHLORIDE 9 MG/ML
100 INJECTION, SOLUTION INTRAVENOUS CONTINUOUS
Status: DISCONTINUED | OUTPATIENT
Start: 2019-02-19 | End: 2019-02-20 | Stop reason: HOSPADM

## 2019-02-19 RX ORDER — HYDROMORPHONE HYDROCHLORIDE 2 MG/ML
1 INJECTION, SOLUTION INTRAMUSCULAR; INTRAVENOUS; SUBCUTANEOUS
Status: DISCONTINUED | OUTPATIENT
Start: 2019-02-19 | End: 2019-02-20 | Stop reason: HOSPADM

## 2019-02-19 RX ORDER — DEXAMETHASONE SODIUM PHOSPHATE 100 MG/10ML
10 INJECTION INTRAMUSCULAR; INTRAVENOUS ONCE
Status: DISCONTINUED | OUTPATIENT
Start: 2019-02-20 | End: 2019-02-20 | Stop reason: HOSPADM

## 2019-02-19 RX ORDER — ACETAMINOPHEN 10 MG/ML
1000 INJECTION, SOLUTION INTRAVENOUS ONCE
Status: COMPLETED | OUTPATIENT
Start: 2019-02-19 | End: 2019-02-19

## 2019-02-19 RX ORDER — SODIUM CHLORIDE 0.9 % (FLUSH) 0.9 %
5-40 SYRINGE (ML) INJECTION AS NEEDED
Status: DISCONTINUED | OUTPATIENT
Start: 2019-02-19 | End: 2019-02-20 | Stop reason: HOSPADM

## 2019-02-19 RX ORDER — CEFAZOLIN SODIUM/WATER 2 G/20 ML
2 SYRINGE (ML) INTRAVENOUS ONCE
Status: COMPLETED | OUTPATIENT
Start: 2019-02-19 | End: 2019-02-19

## 2019-02-19 RX ORDER — NALOXONE HYDROCHLORIDE 0.4 MG/ML
0.1 INJECTION, SOLUTION INTRAMUSCULAR; INTRAVENOUS; SUBCUTANEOUS
Status: DISCONTINUED | OUTPATIENT
Start: 2019-02-19 | End: 2019-02-19 | Stop reason: HOSPADM

## 2019-02-19 RX ORDER — BUPIVACAINE HYDROCHLORIDE 7.5 MG/ML
INJECTION, SOLUTION INTRASPINAL
Status: COMPLETED | OUTPATIENT
Start: 2019-02-19 | End: 2019-02-19

## 2019-02-19 RX ORDER — SERTRALINE HYDROCHLORIDE 100 MG/1
200 TABLET, FILM COATED ORAL DAILY
Status: DISCONTINUED | OUTPATIENT
Start: 2019-02-20 | End: 2019-02-20 | Stop reason: HOSPADM

## 2019-02-19 RX ORDER — CLONAZEPAM 0.5 MG/1
0.5 TABLET ORAL
Status: DISCONTINUED | OUTPATIENT
Start: 2019-02-19 | End: 2019-02-20 | Stop reason: HOSPADM

## 2019-02-19 RX ORDER — TRANEXAMIC ACID 100 MG/ML
INJECTION, SOLUTION INTRAVENOUS AS NEEDED
Status: DISCONTINUED | OUTPATIENT
Start: 2019-02-19 | End: 2019-02-19 | Stop reason: HOSPADM

## 2019-02-19 RX ORDER — OXYCODONE HYDROCHLORIDE 5 MG/1
5 TABLET ORAL
Status: DISCONTINUED | OUTPATIENT
Start: 2019-02-19 | End: 2019-02-20 | Stop reason: HOSPADM

## 2019-02-19 RX ORDER — MIDAZOLAM HYDROCHLORIDE 1 MG/ML
INJECTION, SOLUTION INTRAMUSCULAR; INTRAVENOUS AS NEEDED
Status: DISCONTINUED | OUTPATIENT
Start: 2019-02-19 | End: 2019-02-19 | Stop reason: HOSPADM

## 2019-02-19 RX ORDER — METOPROLOL SUCCINATE 25 MG/1
25 TABLET, EXTENDED RELEASE ORAL DAILY
Status: DISCONTINUED | OUTPATIENT
Start: 2019-02-20 | End: 2019-02-20 | Stop reason: HOSPADM

## 2019-02-19 RX ORDER — HYDROCHLOROTHIAZIDE 25 MG/1
12.5 TABLET ORAL DAILY
Status: DISCONTINUED | OUTPATIENT
Start: 2019-02-20 | End: 2019-02-20 | Stop reason: HOSPADM

## 2019-02-19 RX ORDER — PROPOFOL 10 MG/ML
INJECTION, EMULSION INTRAVENOUS
Status: DISCONTINUED | OUTPATIENT
Start: 2019-02-19 | End: 2019-02-19 | Stop reason: HOSPADM

## 2019-02-19 RX ORDER — DIPHENHYDRAMINE HYDROCHLORIDE 50 MG/ML
12.5 INJECTION, SOLUTION INTRAMUSCULAR; INTRAVENOUS
Status: DISCONTINUED | OUTPATIENT
Start: 2019-02-19 | End: 2019-02-19 | Stop reason: HOSPADM

## 2019-02-19 RX ORDER — OXYCODONE HYDROCHLORIDE 5 MG/1
10 TABLET ORAL
Status: DISCONTINUED | OUTPATIENT
Start: 2019-02-19 | End: 2019-02-19 | Stop reason: HOSPADM

## 2019-02-19 RX ORDER — ZOLPIDEM TARTRATE 5 MG/1
5 TABLET ORAL
Status: DISCONTINUED | OUTPATIENT
Start: 2019-02-19 | End: 2019-02-20 | Stop reason: HOSPADM

## 2019-02-19 RX ORDER — BUPROPION HYDROCHLORIDE 300 MG/1
300 TABLET ORAL DAILY
Status: DISCONTINUED | OUTPATIENT
Start: 2019-02-20 | End: 2019-02-20 | Stop reason: HOSPADM

## 2019-02-19 RX ORDER — ONDANSETRON 2 MG/ML
INJECTION INTRAMUSCULAR; INTRAVENOUS AS NEEDED
Status: DISCONTINUED | OUTPATIENT
Start: 2019-02-19 | End: 2019-02-19 | Stop reason: HOSPADM

## 2019-02-19 RX ORDER — SODIUM CHLORIDE 0.9 % (FLUSH) 0.9 %
5-40 SYRINGE (ML) INJECTION EVERY 8 HOURS
Status: CANCELLED | OUTPATIENT
Start: 2019-02-19

## 2019-02-19 RX ORDER — HYDROMORPHONE HYDROCHLORIDE 2 MG/ML
0.5 INJECTION, SOLUTION INTRAMUSCULAR; INTRAVENOUS; SUBCUTANEOUS
Status: DISCONTINUED | OUTPATIENT
Start: 2019-02-19 | End: 2019-02-19 | Stop reason: HOSPADM

## 2019-02-19 RX ORDER — KETOROLAC TROMETHAMINE 30 MG/ML
INJECTION, SOLUTION INTRAMUSCULAR; INTRAVENOUS AS NEEDED
Status: DISCONTINUED | OUTPATIENT
Start: 2019-02-19 | End: 2019-02-19 | Stop reason: HOSPADM

## 2019-02-19 RX ORDER — LOSARTAN POTASSIUM 25 MG/1
25 TABLET ORAL DAILY
Status: DISCONTINUED | OUTPATIENT
Start: 2019-02-20 | End: 2019-02-20 | Stop reason: HOSPADM

## 2019-02-19 RX ORDER — LIDOCAINE HYDROCHLORIDE 10 MG/ML
0.1 INJECTION INFILTRATION; PERINEURAL AS NEEDED
Status: DISCONTINUED | OUTPATIENT
Start: 2019-02-19 | End: 2019-02-19 | Stop reason: HOSPADM

## 2019-02-19 RX ORDER — TRAZODONE HYDROCHLORIDE 50 MG/1
100 TABLET ORAL
Status: DISCONTINUED | OUTPATIENT
Start: 2019-02-19 | End: 2019-02-20 | Stop reason: HOSPADM

## 2019-02-19 RX ORDER — PROPOFOL 10 MG/ML
INJECTION, EMULSION INTRAVENOUS AS NEEDED
Status: DISCONTINUED | OUTPATIENT
Start: 2019-02-19 | End: 2019-02-19 | Stop reason: HOSPADM

## 2019-02-19 RX ORDER — SERTRALINE HYDROCHLORIDE 100 MG/1
100 TABLET, FILM COATED ORAL 2 TIMES DAILY
Status: DISCONTINUED | OUTPATIENT
Start: 2019-02-19 | End: 2019-02-19

## 2019-02-19 RX ORDER — NITROGLYCERIN 0.4 MG/1
0.4 TABLET SUBLINGUAL
Status: DISCONTINUED | OUTPATIENT
Start: 2019-02-19 | End: 2019-02-20 | Stop reason: HOSPADM

## 2019-02-19 RX ADMIN — OXYCODONE HYDROCHLORIDE 10 MG: 5 TABLET ORAL at 20:06

## 2019-02-19 RX ADMIN — MIDAZOLAM HYDROCHLORIDE 1 MG: 1 INJECTION, SOLUTION INTRAMUSCULAR; INTRAVENOUS at 12:29

## 2019-02-19 RX ADMIN — PROPOFOL 40 MG: 10 INJECTION, EMULSION INTRAVENOUS at 12:29

## 2019-02-19 RX ADMIN — SODIUM CHLORIDE, SODIUM LACTATE, POTASSIUM CHLORIDE, AND CALCIUM CHLORIDE: 600; 310; 30; 20 INJECTION, SOLUTION INTRAVENOUS at 13:04

## 2019-02-19 RX ADMIN — Medication 3 AMPULE: at 10:52

## 2019-02-19 RX ADMIN — PROPOFOL 50 MCG/KG/MIN: 10 INJECTION, EMULSION INTRAVENOUS at 12:30

## 2019-02-19 RX ADMIN — SODIUM CHLORIDE 100 ML/HR: 900 INJECTION, SOLUTION INTRAVENOUS at 20:06

## 2019-02-19 RX ADMIN — DEXAMETHASONE SODIUM PHOSPHATE 10 MG: 100 INJECTION INTRAMUSCULAR; INTRAVENOUS at 12:37

## 2019-02-19 RX ADMIN — Medication 2 G: at 20:04

## 2019-02-19 RX ADMIN — TRAZODONE HYDROCHLORIDE 100 MG: 50 TABLET ORAL at 21:44

## 2019-02-19 RX ADMIN — SODIUM CHLORIDE, SODIUM LACTATE, POTASSIUM CHLORIDE, AND CALCIUM CHLORIDE 1000 ML: 600; 310; 30; 20 INJECTION, SOLUTION INTRAVENOUS at 10:52

## 2019-02-19 RX ADMIN — HYDROMORPHONE HYDROCHLORIDE 0.5 MG: 2 INJECTION, SOLUTION INTRAMUSCULAR; INTRAVENOUS; SUBCUTANEOUS at 17:34

## 2019-02-19 RX ADMIN — FENTANYL CITRATE 50 MCG: 50 INJECTION INTRAMUSCULAR; INTRAVENOUS at 11:43

## 2019-02-19 RX ADMIN — MIDAZOLAM HYDROCHLORIDE 1 MG: 1 INJECTION, SOLUTION INTRAMUSCULAR; INTRAVENOUS at 12:24

## 2019-02-19 RX ADMIN — TUBERCULIN PURIFIED PROTEIN DERIVATIVE 5 UNITS: 5 INJECTION, SOLUTION INTRADERMAL at 10:53

## 2019-02-19 RX ADMIN — Medication 1 AMPULE: at 20:07

## 2019-02-19 RX ADMIN — ONDANSETRON 4 MG: 2 INJECTION INTRAMUSCULAR; INTRAVENOUS at 13:30

## 2019-02-19 RX ADMIN — HYDROMORPHONE HYDROCHLORIDE 0.5 MG: 2 INJECTION, SOLUTION INTRAMUSCULAR; INTRAVENOUS; SUBCUTANEOUS at 17:29

## 2019-02-19 RX ADMIN — ACETAMINOPHEN 1000 MG: 10 INJECTION, SOLUTION INTRAVENOUS at 20:09

## 2019-02-19 RX ADMIN — ASPIRIN 81 MG: 81 TABLET, DELAYED RELEASE ORAL at 20:07

## 2019-02-19 RX ADMIN — ATORVASTATIN CALCIUM 80 MG: 40 TABLET, FILM COATED ORAL at 20:07

## 2019-02-19 RX ADMIN — BUPIVACAINE HYDROCHLORIDE 15 MG: 7.5 INJECTION, SOLUTION INTRASPINAL at 12:26

## 2019-02-19 RX ADMIN — SODIUM CHLORIDE, SODIUM LACTATE, POTASSIUM CHLORIDE, AND CALCIUM CHLORIDE: 600; 310; 30; 20 INJECTION, SOLUTION INTRAVENOUS at 09:15

## 2019-02-19 RX ADMIN — TRANEXAMIC ACID 1 G: 100 INJECTION, SOLUTION INTRAVENOUS at 12:32

## 2019-02-19 RX ADMIN — Medication 2 G: at 12:22

## 2019-02-19 RX ADMIN — MIDAZOLAM 1 MG: 1 INJECTION INTRAMUSCULAR; INTRAVENOUS at 11:43

## 2019-02-19 NOTE — ANESTHESIA POSTPROCEDURE EVALUATION
Procedure(s): RIGHT KNEE ARTHROPLASTY TOTAL W/ DEPUY. Anesthesia Post Evaluation Multimodal analgesia: multimodal analgesia used between 6 hours prior to anesthesia start to PACU discharge Patient location during evaluation: PACU Patient participation: complete - patient participated Level of consciousness: awake Pain management: adequate Airway patency: patent Anesthetic complications: no 
Cardiovascular status: acceptable and hemodynamically stable Respiratory status: acceptable Hydration status: acceptable Comments: Acceptable for discharge from PACU. Post anesthesia nausea and vomiting:  none Visit Vitals /74 Pulse 62 Temp 36.4 °C (97.5 °F) Resp 16 Ht 5' 4\" (1.626 m) Wt 81.2 kg (179 lb 0.2 oz) SpO2 96% BMI 30.73 kg/m²

## 2019-02-19 NOTE — OP NOTES
[facility]  Cemented Total Knee Arthroplasty  Patient: Monae Hidalgo   : 1960  Medical Record Number:[id number]  Pre-operative Diagnosis:  Primary osteoarthritis of right knee [M17.11]  Post-operative Diagnosis: Primary osteoarthritis of right knee [M17.11]    Surgeon: Susan Hess MD  Assistant: Dianna Mccoy PA-C    Anesthesia: Spinal    Procedure: Total Knee Arthroplasty with use of Bone Cement  The complexity of the total joint surgery requires the use of a first assistant for positioning, retraction and assistance in closure. The patient's Body mass index is 30.73 kg/m²., BMI's greater then 40 make surgical exposure and retraction extremely difficult and increase operative time. Tourniquet Time: none  EBL: 150cc  Additional Findings: Severe DJD  Releases none    Monae Hidalgo was brought to the operating room and positioned on the operating table. She was anethestized  A gutierrez catheter was placed preoperatively and IV antibiotics was administered. Prior to the incision being made a timeout was called identifying the patient, procedure ,operative side and surgeon. . The right leg was prepped and draped in the usual sterile manner  An anterior longitudinal incision was accomplished just medial to the tibial tubercle and extending approximal 6 centimeters proximal to the superior pole of the patella. A medial parapatellar capsular incision was performed. The medial capsular flap was elevated around to the insertion of the semimembranous tendon. The patella was everted and the knee flexed and externally rotated. The medial and external menisci were excised. The lateral half of the fat pad excised and the patella femoral ligament was released. The anterior cruciate ligament was resected and the posterior cruciate ligament was substituted. Using extramedullary instrumentation, the tibial cut was accomplished with appropriate posterior slope.   Approxiamately 2 mm of bone was removed from the low side of the tibia. The distal femur was next addressed. A drill hole was made above the intracondylar notch. Using appropriate intramedullary instrumentation,a 6 degree valgus distal cut was accomplished. A femur was sized. The anterior and posterior cuts were then made about the distal femur. The osteophytes were removed from the tibial and femoral surfaces. The flexion and extension gaps were assessed with the appropriate spacer blocks. Additional surgical procedures included none. The flexion and extension gaps were deemed appropriately balanced. The appropriate cutting blocks were then utilized to perform the anterior chamfer, posterior chamfer and notch cuts, with appropriate lateral tranlation accomplished for the patellofemoral groove. The tibia was sized. The tibial base plate was pinned into place with the appropriate external rotation and stem site prepared. A preliminary range of motion was accomplished with the above size trial components. A polyethylene insert allowed the patient to obtain full extension as well as appropriate flexion. The patient's ligaments were stable in flexion and extension to medial and lateral stressing and the alignment was through the appropriate mechanical axis. The patella was then everted. The bone was resected appropriately for a pegged patella button. A trial reduction revealed appropriate tracking through the patellofemoral groove. All trial components were removed and the cut surfaces prepared for cementing with irrigation and debridement of the bone interstices. The implants were cemented into position and pressurized in the usual fashion. Bone and cement debris were meticulously removed. The betadine lavage protocol was used. Jessie Nickerson knee was placed through range of motion and noted to be stable as mentioned above with the trail components. The wound was dry, therefore no drain was used. The operative knee was injected with 60cc of Naropin, 10 cc's of morphine and 1 cc of 30mg of Toradol. The capsular layer was closed using a #1 vicryl suture, while subcutaneous layers were closed using 2-0 Vicryl interrupted sutures. Finally the skin was closed using 3-0 Vicryl and skin staples, which were applied in occlusive fashion and sterile bandage applied. An Iceman cryo pad was applied on the operative leg. Sponge count and needle counts were correct. Denice Ruggiero left the operating room     Implants:   Implant Name Type Inv.  Item Serial No.  Lot No. LRB No. Used   CEMENT BNE HV R 40GM -- PALACOS R [id number] - [id number]  CEMENT BNE HV R 40GM -- PALACOS R [id number] [facility] [id number] Right 2   FEM PS SZ 5 RT LAURA -- ATTUNE - [id number]  FEM PS SZ 5 RT LAURA -- ATTUNE [id number] [facility] [employer] [id number] Right 1   PAT LAURA DOME MEDIAL 38MM -- ATTUNE - [id number]  PAT LAURA DOME MEDIAL 38MM -- ATTUNE [id number] [facility] [employer] [id number] Right 1   INSERT TIB PS RP SZ 5 6MM -- ATTUNE - [id number]  INSERT TIB PS RP SZ 5 6MM -- ATTUNE [id number] [facility] [employer] [id number] Right 1   tibial base   [id number]  [id number] Right 1     Signed By: Sushila Morley MD

## 2019-02-19 NOTE — PERIOP NOTES
Teach back method used with patient concerning hibiclens wash, TB screening, incentive spirometer( 1000 preop demonstrated), and pain management goals.  Patient and family were provided with home discharge needs list.

## 2019-02-19 NOTE — PROGRESS NOTES
TRANSFER - IN REPORT: 
 
Verbal report received from [employer] (name) on [facility]  being received from PACU (unit) for routine post - op Report consisted of patients Situation, Background, Assessment and  
Recommendations(SBAR). Information from the following report(s) SBAR, Kardex, OR Summary, Procedure Summary, Intake/Output, MAR, Accordion and Recent Results was reviewed with the receiving nurse. Opportunity for questions and clarification was provided. Assessment completed upon patients arrival to unit and care assumed.

## 2019-02-19 NOTE — INTERVAL H&P NOTE
H&P Update: Maggie Price was seen and examined. History and physical has been reviewed. The patient has been examined. There have been no significant clinical changes since the completion of the originally dated History and Physical. 
 
Signed By: IZZY Tuttle  February 19, 2019 10:48 AM

## 2019-02-19 NOTE — PERIOP NOTES
TRANSFER - OUT REPORT: 
 
Verbal report given to Birdie THOMAS(name) on [facility]  being [facility] to [facility]unit[facility] for routine progression of care Report consisted of patients Situation, Background, Assessment and  
Recommendations(SBAR). Information from the following report(s) SBAR, Kardex, OR Summary, Intake/Output, MAR and Cardiac Rhythm NSR was reviewed with the receiving nurse. Lines:  
Peripheral IV 49/77/59 Left Cephalic (Active) Site Assessment Clean, dry, & intact 2/19/2019  2:06 PM  
Phlebitis Assessment 0 2/19/2019  2:06 PM  
Infiltration Assessment 0 2/19/2019  2:06 PM  
Dressing Status Clean, dry, & intact 2/19/2019  2:06 PM  
Dressing Type Transparent 2/19/2019  2:06 PM  
Hub Color/Line Status Infusing 2/19/2019  2:06 PM  
Action Taken Blood drawn 2/19/2019 10:55 AM  
  
 
Opportunity for questions and clarification was provided. Patient transported with: 
 O2 @ 2 liters

## 2019-02-19 NOTE — ANESTHESIA PROCEDURE NOTES
Spinal Block Start time: 2/19/2019 12:24 PM 
End time: 2/19/2019 12:26 PM 
Performed by: Savannah Foss MD 
Authorized by: Savannah Foss MD  
 
Pre-procedure: Indications: primary anesthetic  Preanesthetic Checklist: patient identified, risks and benefits discussed, anesthesia consent, site marked, patient being monitored and timeout performed Timeout Time: 12:23 Spinal Block:  
Patient Position:  Seated Prep Region:  Lumbar Prep: chlorhexidine Location:  L3-4 Technique:  Single shot Local Dose (mL):  3 Needle:  
Needle Type:  Pencil-tip Needle Gauge:  25 G Attempts:  1 Events: CSF confirmed, no blood with aspiration and no paresthesia Assessment: 
Insertion:  Uncomplicated Patient tolerance:  Patient tolerated the procedure well with no immediate complications

## 2019-02-19 NOTE — ANESTHESIA PROCEDURE NOTES
Peripheral Block Start time: 2/19/2019 11:44 AM 
End time: 2/19/2019 11:46 AM 
Performed by: Carlos Coronel MD 
Authorized by: Carlos Coronel MD  
 
 
Pre-procedure: Indications: at surgeon's request and post-op pain management Preanesthetic Checklist: patient identified, risks and benefits discussed, site marked, timeout performed, anesthesia consent given and patient being monitored Timeout Time: 11:43 Block Type:  
Block Type: Adductor canal 
Laterality:  Right Monitoring:  Standard ASA monitoring, continuous pulse ox, frequent vital sign checks, heart rate, responsive to questions and oxygen Injection Technique:  Single shot Procedures: ultrasound guided Patient Position: supine Prep: chlorhexidine Location:  Mid thigh Needle Type:  Stimuplex Needle Gauge:  21 G Needle Localization:  Ultrasound guidance Assessment: 
Number of attempts:  1 Injection Assessment:  Incremental injection every 5 mL, no paresthesia, ultrasound image on chart, local visualized surrounding nerve on ultrasound, negative aspiration for blood and no intravascular symptoms Patient tolerance:  Patient tolerated the procedure well with no immediate complications

## 2019-02-19 NOTE — ANESTHESIA PREPROCEDURE EVALUATION
Anesthetic History No history of anesthetic complications Review of Systems / Medical History Patient summary reviewed and pertinent labs reviewed Pulmonary Neuro/Psych Psychiatric history Cardiovascular Hypertension CAD, cardiac stents (s/p BHUPINDER 1/18 - Brilinta held x 7d, remains on bASA) and hyperlipidemia Exercise tolerance: >4 METS 
  
GI/Hepatic/Renal 
  
GERD: well controlled Endo/Other Arthritis Other Findings Physical Exam 
 
Airway Mallampati: II 
TM Distance: > 6 cm Neck ROM: normal range of motion Mouth opening: Normal 
 
 Cardiovascular Rhythm: regular Rate: normal 
 
 
 
 Dental 
 
Dentition: Full upper dentures Pulmonary Breath sounds clear to auscultation Abdominal 
 
 
 
 Other Findings Anesthetic Plan ASA: 3 Anesthesia type: spinal 
 
 
Post-op pain plan if not by surgeon: peripheral nerve block single Anesthetic plan and risks discussed with: Patient

## 2019-02-19 NOTE — PROGRESS NOTES
's pre-op visit and prayer with patient in block area as requested. Reyna Galvan MDiv, BS Board Certified [employer]

## 2019-02-20 VITALS
HEART RATE: 67 BPM | BODY MASS INDEX: 30.56 KG/M2 | SYSTOLIC BLOOD PRESSURE: 105 MMHG | WEIGHT: 179.01 LBS | HEIGHT: 64 IN | TEMPERATURE: 97.5 F | DIASTOLIC BLOOD PRESSURE: 57 MMHG | RESPIRATION RATE: 16 BRPM | OXYGEN SATURATION: 93 %

## 2019-02-20 PROBLEM — Z96.651 S/P TKR (TOTAL KNEE REPLACEMENT) USING CEMENT, RIGHT: Status: ACTIVE | Noted: 2019-02-20

## 2019-02-20 PROCEDURE — 97110 THERAPEUTIC EXERCISES: CPT

## 2019-02-20 PROCEDURE — 97161 PT EVAL LOW COMPLEX 20 MIN: CPT

## 2019-02-20 PROCEDURE — 97116 GAIT TRAINING THERAPY: CPT

## 2019-02-20 PROCEDURE — 97150 GROUP THERAPEUTIC PROCEDURES: CPT

## 2019-02-20 PROCEDURE — 97535 SELF CARE MNGMENT TRAINING: CPT

## 2019-02-20 PROCEDURE — 74011250637 HC RX REV CODE- 250/637: Performed by: ORTHOPAEDIC SURGERY

## 2019-02-20 PROCEDURE — 97165 OT EVAL LOW COMPLEX 30 MIN: CPT

## 2019-02-20 PROCEDURE — 74011250636 HC RX REV CODE- 250/636: Performed by: ORTHOPAEDIC SURGERY

## 2019-02-20 PROCEDURE — 94760 N-INVAS EAR/PLS OXIMETRY 1: CPT

## 2019-02-20 RX ORDER — ASPIRIN 81 MG/1
81 TABLET ORAL EVERY 12 HOURS
Qty: 60 TAB | Refills: 0 | Status: SHIPPED | OUTPATIENT
Start: 2019-02-20 | End: 2019-03-22

## 2019-02-20 RX ORDER — OXYCODONE HYDROCHLORIDE 5 MG/1
5 TABLET ORAL
Qty: 60 TAB | Refills: 0 | Status: SHIPPED | OUTPATIENT
Start: 2019-02-20 | End: 2019-05-31

## 2019-02-20 RX ADMIN — OXYCODONE HYDROCHLORIDE 10 MG: 5 TABLET ORAL at 06:08

## 2019-02-20 RX ADMIN — Medication 1 AMPULE: at 09:52

## 2019-02-20 RX ADMIN — Medication 2 G: at 03:26

## 2019-02-20 RX ADMIN — HYDROMORPHONE HYDROCHLORIDE 1 MG: 2 INJECTION, SOLUTION INTRAMUSCULAR; INTRAVENOUS; SUBCUTANEOUS at 00:04

## 2019-02-20 RX ADMIN — ACETAMINOPHEN 1000 MG: 500 TABLET, FILM COATED ORAL at 00:03

## 2019-02-20 RX ADMIN — ACETAMINOPHEN 1000 MG: 500 TABLET, FILM COATED ORAL at 06:09

## 2019-02-20 RX ADMIN — METOPROLOL SUCCINATE 25 MG: 25 TABLET, EXTENDED RELEASE ORAL at 09:52

## 2019-02-20 RX ADMIN — ASPIRIN 81 MG: 81 TABLET, DELAYED RELEASE ORAL at 09:53

## 2019-02-20 RX ADMIN — SERTRALINE HYDROCHLORIDE 200 MG: 100 TABLET ORAL at 09:53

## 2019-02-20 RX ADMIN — ACETAMINOPHEN 1000 MG: 500 TABLET, FILM COATED ORAL at 12:34

## 2019-02-20 RX ADMIN — OXYCODONE HYDROCHLORIDE 10 MG: 5 TABLET ORAL at 02:15

## 2019-02-20 RX ADMIN — SENNOSIDES AND DOCUSATE SODIUM 2 TABLET: 8.6; 5 TABLET ORAL at 09:53

## 2019-02-20 RX ADMIN — OXYCODONE HYDROCHLORIDE 10 MG: 5 TABLET ORAL at 10:03

## 2019-02-20 NOTE — PROGRESS NOTES
Problem: Self Care Deficits Care Plan (Adult) Goal: *Acute Goals and Plan of Care (Insert Text) GOALS:  
DISCHARGE GOALS (in preparation for going home/rehab):  1 days 1. Ms. Gonzalez will perform one lower body dressing activity with minimal assistance required to demonstrate improved functional mobility and safety. -GOAL MET 2/20/2019 2. Ms. Gonzalez will perform one lower body bathing activity with minimal assistance required to demonstrate improved functional mobility and safety. -GOAL MET 2/20/2019 3. Ms. Gonzalez will perform toileting/toilet transfer with contact guard assistance to demonstrate improved functional mobility and safety. -GOAL MET 2/20/2019 4. Ms. Dimsa Jones will perform shower transfer with contact guard assistance to demonstrate improved functional mobility and safety. -GOAL MET 2/20/2019 JOINT CAMP OCCUPATIONAL THERAPY TKA: Initial Assessment, Discharge and Treatment Day: 1st 2/20/2019INPATIENT: Hospital Day: 2 Payor: BLUE CROSS / Plan: [place]UE [place]OSS BLUE ESSENTIALS ISMAEL / Product Type: ISMAEL /  
  
NAME/AGE/GENDER: Lashon Payor is a 66 y.o. female PRIMARY DIAGNOSIS:  Primary osteoarthritis of right knee [M17.11] Procedure(s) and Anesthesia Type: 
   * RIGHT KNEE ARTHROPLASTY TOTAL W/ DEPUY - Spinal (Right) ICD-10: Treatment Diagnosis:  
 · Pain in Right Knee (M25.561) · Stiffness of Right Knee, Not elsewhere classified (M25.661) ASSESSMENT:  
Ms. Dimas Jones is s/p Right TKA and presents with decreased weight bearing on R LE and decreased independence with functional mobility and activities of daily living as compared to baseline level of function and safety. After initial evaluation treatment indicated: Patient completed shower and dressing as charter below in ADL grid and is ambulating with rolling walker and stand by assist.  Patient has met 4/4 goals and plans to return home with good family support from fiance and son.   Family able to provide patient with appropriate level of assistance at this time. Patient is planning to go home today and should do well for self cares and transfers during ADL's.  
D/C OT for acute deficits. .   
 
 
 
This section established at most recent assessment PROBLEM LIST (Impairments causing functional limitations): 1. Decreased Strength 2. Decreased ADL/Functional Activities 3. Decreased Transfer Abilities 4. Increased Pain 5. Increased Fatigue 6. Decreased Flexibility/Joint Mobility 7. Decreased Knowledge of Precautions INTERVENTIONS PLANNED: (Benefits and precautions of occupational therapy have been discussed with the patient.) 1. Activities of daily living training 2. Adaptive equipment training 3. Balance training 4. Clothing management 5. Donning&doffing training 6. Theraputic activity TREATMENT PLAN: Frequency/Duration: Follow patient 1tx to address above goals. Rehabilitation Potential For Stated Goals: Excellent RECOMMENDED REHABILITATION/EQUIPMENT: (at time of discharge pending progress): Continue Skilled Therapy. OCCUPATIONAL PROFILE AND HISTORY:  
History of Present Injury/Illness (Reason for Referral): Pt presents this date s/p (right) TKA. Past Medical History/Comorbidities: Ms. Julio Ivory  has a past medical history of Anxiety, Arthritis, Atherosclerosis of native coronary artery of native heart without angina pectoris (2016), Depression, GERD (gastroesophageal reflux disease), Hypercholesterolemia, Hypertension, MI (myocardial infarction) ([address] (2018), Sleep apnea, and Ventricular fibrillation ([facility] [address] Ms. Julio Ivory  has a past surgical history that includes hx breast biopsy; hx hysterectomy; hx  section (); hx  section (); hx tubal ligation (); hx heart catheterization (2018); and hx colonoscopy (). Social History/Living Environment:  
Home Environment: Private residence Living Alone: No 
 Support Systems: Spouse/Significant Other/Partner(Fiance) Patient Expects to be Discharged to[de-identified] Private residence Prior Level of Function/Work/Activity: 
Independent prior. L knee gives problems at times. Number of Personal Factors/Comorbidities that affect the Plan of Care: Brief history (0):  LOW COMPLEXITY ASSESSMENT OF OCCUPATIONAL PERFORMANCE[de-identified]  
Most Recent Physical Functioning:  
Balance Sitting: Intact Standing: With support Coordination Fine Motor Skills-Upper: Left Intact; Right Intact Gross Motor Skills-Upper: Left Intact; Right Intact Mental Status Neurologic State: Alert Orientation Level: Oriented X4 Cognition: Appropriate decision making Perception: Appears intact Basic ADLs (From Assessment) Complex ADLs (From Assessment) Basic ADL Feeding: Independent Oral Facial Hygiene/Grooming: Independent Bathing: Supervision Type of Bath: Chlorhexidine (CHG), Full, Shower Upper Body Dressing: Independent Lower Body Dressing: Stand-by assistance Toileting: Supervision Grooming/Bathing/Dressing Activities of Daily Living Grooming Grooming Assistance: Independent Upper Body Bathing Bathing Assistance: Independent Lower Body Bathing Bathing Assistance: Stand-by assistance Upper Body Dressing Assistance Dressing Assistance: Independent Functional Transfers Bathroom Mobility: Supervision/set up Toilet Transfer : Supervision Shower Transfer: Supervision Lower Body Dressing Assistance Dressing Assistance: Stand-by assistance Underpants: Supervision/set-up Socks: Supervision/set-up Slip on Shoes with Back: Supervision/set-up Bed/Mat Mobility Supine to Sit: Stand-by assistance Sit to Stand: Supervision Bed to Chair: Supervision Physical Skills Involved: 1. Range of Motion 2. Balance 3.  Strength Cognitive Skills Affected (resulting in the inability to perform in a timely and safe manner): 
 1. wfl Psychosocial Skills Affected: 1. wfl Number of elements that affect the Plan of Care: 1-3:  LOW COMPLEXITY CLINICAL DECISION MAKING:  
[employer] AM[employer] 6 Clicks Daily Activity Inpatient Short Form How much help from another person does the patient currently need. .. Total A Lot A Little None 1. Putting on and taking off regular lower body clothing? [] 1   [] 2   [x] 3   [] 4  
2. Bathing (including washing, rinsing, drying)? [] 1   [] 2   [x] 3   [] 4  
3. Toileting, which includes using toilet, bedpan or urinal?   [] 1   [] 2   [x] 3   [] 4  
4. Putting on and taking off regular upper body clothing? [] 1   [] 2   [] 3   [x] 4  
5. Taking care of personal grooming such as brushing teeth? [] 1   [] 2   [] 3   [x] 4  
6. Eating meals? [] 1   [] 2   [] 3   [x] 4  
© 2007, Trustees of [facility] [employer], under license to [employer]. All rights reserved Score:  Initial: 21 Most Recent: X (Date: -- ) Interpretation of Tool:  Represents activities that are increasingly more difficult (i.e. Bed mobility, Transfers, Gait). Medical Necessity:    
· Skilled intervention continues to be required due to deficits listed above. Reason for Services/Other Comments: 
· Patient continues to require skilled intervention due to new TKA. Use of outcome tool(s) and clinical judgement create a POC that gives a: LOW COMPLEXITY  
  
 
 
 
TREATMENT:  
(In addition to Assessment/Re-Assessment sessions the following treatments were rendered) Pre-treatment Symptoms/Complaints:   
Pain: Initial:  
Pain Intensity 1: 3  Post Session:  3 Self Care: (30): Procedure(s) (per grid) utilized to improve and/or restore self-care/home management as related to dressing, bathing, toileting and grooming. Required minimal verbal cueing to facilitate activities of daily living skills. Initial evaluation 8 minutes. Treatment/Session Assessment: Response to Treatment:  Good, sitting up in recliner. All needs within reach. Education: 
[] Home Exercises [x] Fall Precautions [] Hip Precautions [] Going Home Video [x] Knee/Hip Prosthesis Review [x] Walker Management/Safety [x] Adaptive Equipment as Needed Interdisciplinary Collaboration:  
o Physical Therapist 
o Occupational Therapist 
o Registered Nurse After treatment position/precautions:  
o Up in chair 
o Bed/Chair-wheels locked 
o Caregiver at bedside 
o Call light within reach 
o RN notified Compliance with Program/Exercises: Compliant all of the time. Recommendations/Intent for next treatment session:  D/C for acute deficits. Total Treatment Duration: OT Patient Time In/Time Out Time In: 0825 Time Out: 9435 Faiza Ponce OT

## 2019-02-20 NOTE — PROGRESS NOTES
Orthopedic Joint Progress Note 2019 Admit Date: 2019 Admit Diagnosis: Primary osteoarthritis of right knee [M17.11] Arthritis of knee, right [M17.11] 1 Day Post-Op Subjective: [place] Counter awake and alert Review of Systems: Pertinent items are noted in HPI. Objective:  
 
PT/OT:  
 
PATIENT MOBILITY Vital Signs:   
Blood pressure 106/62, pulse 65, temperature 97.6 °F (36.4 °C), resp. rate 18, height 5' 4\" (1.626 m), weight 81.2 kg (179 lb 0.2 oz), SpO2 96 %. Temp (24hrs), Av.8 °F (36.6 °C), Min:97.5 °F (36.4 °C), Max:98.5 °F (36.9 °C) Pain Control:  
Pain Assessment Pain Scale 1: Numeric (0 - 10) Pain Intensity 1: 4 Pain Onset 1: about a year and a half Pain Location 1: Knee Pain Orientation 1: Right Pain Description 1: Aching Meds: 
Current Facility-Administered Medications Medication Dose Route Frequency  tuberculin injection 5 Units  5 Units IntraDERMal ONCE  
 atorvastatin (LIPITOR) tablet 80 mg  80 mg Oral QHS  buPROPion XL (WELLBUTRIN XL) tablet 300 mg  (Patient Supplied)  300 mg Oral DAILY  clonazePAM (KlonoPIN) tablet 0.5 mg  0.5 mg Oral BID PRN  
 hydroCHLOROthiazide (HYDRODIURIL) tablet 12.5 mg  12.5 mg Oral DAILY  losartan (COZAAR) tablet 25 mg  25 mg Oral DAILY  metoprolol succinate (TOPROL-XL) XL tablet 25 mg  25 mg Oral DAILY  nitroglycerin (NITROSTAT) tablet 0.4 mg  0.4 mg SubLINGual Q5MIN PRN  
 traZODone (DESYREL) tablet 100 mg  100 mg Oral QHS  alcohol 62% (NOZIN) nasal  1 Ampule  1 Ampule Topical Q12H  
 0.9% sodium chloride infusion  100 mL/hr IntraVENous CONTINUOUS  
 sodium chloride (NS) flush 5-40 mL  5-40 mL IntraVENous PRN  
 acetaminophen (TYLENOL) tablet 1,000 mg  1,000 mg Oral Q6H  
 oxyCODONE IR (ROXICODONE) tablet 10 mg  10 mg Oral Q4H PRN  
 HYDROmorphone (PF) (DILAUDID) injection 1 mg  1 mg IntraVENous Q3H PRN  
  naloxone (NARCAN) injection 0.2-0.4 mg  0.2-0.4 mg IntraVENous Q10MIN PRN  
 dexamethasone (DECADRON) injection 10 mg  10 mg IntraVENous ONCE  promethazine (PHENERGAN) tablet 25 mg  25 mg Oral Q6H PRN  
 diphenhydrAMINE (BENADRYL) capsule 25 mg  25 mg Oral Q4H PRN  
 senna-docusate (PERICOLACE) 8.6-50 mg per tablet 2 Tab  2 Tab Oral DAILY  zolpidem (AMBIEN) tablet 5 mg  5 mg Oral QHS PRN  
 aspirin delayed-release tablet 81 mg  81 mg Oral Q12H  
 ondansetron (ZOFRAN ODT) tablet 8 mg  8 mg Oral Q8H PRN  
 oxyCODONE IR (ROXICODONE) tablet 5 mg  5 mg Oral Q4H PRN  
 sertraline (ZOLOFT) tablet 200 mg  200 mg Oral DAILY  
  
 
LAB:   
Lab Results Component Value Date/Time INR 1.0 01/28/2019 07:50 AM  
 INR 1.0 04/07/2016 08:35 AM  
 
Lab Results Component Value Date/Time HGB 10.4 (L) 02/19/2019 07:53 PM  
 HGB 11.6 (L) 01/28/2019 07:50 AM  
 HGB 11.1 (L) 01/03/2018 06:50 AM  
 
 
Wound Knee Right (Active) Dressing Status  Clean, dry, and intact 2/19/2019  7:50 PM  
Dressing Type  Aquacel 2/19/2019  7:50 PM  
Number of days: 1 Physical Exam: 
Calves soft/ neuro intact Assessment:  
  
Principal Problem: S/P TKR (total knee replacement) using cement, right (2/20/2019) Active Problems: 
  Arthritis of knee, right (2/19/2019) Plan:  
 
Continue PT/OT/Rehab Consult: Rehab team including PT, OT, recreational therapy, and  Home soon Patient Expects to be Discharged to[de-identified] Other (comment)(OR) 
  
Signed By: Cally Esquivel MD

## 2019-02-20 NOTE — DISCHARGE INSTRUCTIONS
[address] [facility]   Patient Discharge Instructions    Albino George / [id number] : 1960    Admitted 2019 Discharged: 2019     IF YOU HAVE ANY PROBLEMS ONCE YOU ARE AT HOME CALL THE FOLLOWING NUMBERS:   Main office number: [phone]    Take Home Medications     · It is important that you take the medication exactly as they are prescribed. · Keep your medication in the bottles provided by the pharmacist and keep a list of the medication names, dosages, and times to be taken in your wallet. · Do not take other medications without consulting your doctor. What to do at [address] your prehospital diet. If you have excessive nausea or vomitting call your doctor's office     Home Physical Therapy is arranged. Use rolling walker when walking. Patients who have had a joint replacement should not drive until you are seen for your follow up appointment by Dr. Eva Noel. When to Call    - Call if you have a temperature greater then 101  - Unable to keep food down  - Loose control of your bladder or bowel function  - Are unable to bear any weight   - Need a pain medication refill       DISCHARGE SUMMARY from Nurse    The following personal items collected during your admission are returned to you:   Dental Appliance: Dental Appliances: With patient, Uppers  Vision:   na  Hearing Aid:   na  Jewelry: Jewelry: None  Clothing: Clothing: Other (comment)(boyfriend has or in car)  Other Valuables: Other Valuables: Cell Phone(with boyfriend)  Valuables sent to safe:   na    PATIENT INSTRUCTIONS:    After general anesthesia or intravenous sedation, for 24 hours or while taking prescription Narcotics:  · Limit your activities  · Do not drive and operate hazardous machinery  · Do not make important personal or business decisions  · Do  not drink alcoholic beverages  · If you have not urinated within 8 hours after discharge, please contact your surgeon on call.     Report the following to your surgeon:  · Excessive pain, swelling, redness or odor of or around the surgical area  · Temperature over 101  · Nausea and vomiting lasting longer than 4 hours or if unable to take medications  · Any signs of decreased circulation or nerve impairment to extremity: change in color, persistent  numbness, tingling, coldness or increase pain  · Any questions, call office @ [phone]      Keep scheduled follow up appointment. If need to change, call office @ [phone]. *  Please give a list of your current medications to your Primary Care Provider. *  Please update this list whenever your medications are discontinued, doses are      changed, or new medications (including over-the-counter products) are added. *  Please carry medication information at all times in case of emergency situations. Patient Education        Total Knee Replacement: What to Expect at Home  Your Recovery    When you leave the hospital, you should be able to move around with a walker or crutches. But you will need someone to help you at home for the next few weeks or until you have more energy and can move around better. If there is no one to help you at home, you may go to a rehabilitation center. You will go home with a bandage and stitches, staples, tissue glue, or tape strips. Change the bandage as your doctor tells you to. If you have stitches or staples, your doctor will remove them 10 to 21 days after your surgery. Glue or tape strips will fall off on their own over time. You may still have some mild pain, and the area may be swollen for 3 to 6 months after surgery. Your knee will continue to improve for 6 to 12 months. You will probably use a walker for 1 to 3 weeks and then use crutches. When you are ready, you can use a cane. You will probably be able to walk on your own in 4 to 8 weeks. You will need to do months of physical rehabilitation (rehab) after a knee replacement.  Rehab will help you strengthen the muscles of the knee and help you regain movement. After you recover, your artificial knee will allow you to do normal daily activities with less pain or no pain at all. You may be able to hike, dance, ride a bike, and play golf. Talk to your doctor about whether you can do more strenuous activities. Always tell your caregivers that you have an artificial knee. How long it will take to walk on your own, return to normal activities, and go back to work depends on your health and how well your rehabilitation (rehab) program goes. The better you do with your rehab exercises, the quicker you will get your strength and movement back. This care sheet gives you a general idea about how long it will take for you to recover. But each person recovers at a different pace. Follow the steps below to get better as quickly as possible. How can you care for yourself at home? Activity    · Rest when you feel tired. You may take a nap, but do not stay in bed all day. When you sit, use a chair with arms. You can use the arms to help you stand up.     · Work with your physical therapist to find the best way to exercise. You may be able to take frequent, short walks using crutches or a walker. What you can do as your knee heals will depend on whether your new knee is cemented or uncemented. You may not be able to do certain things for a while if your new knee is uncemented.     · After your knee has healed enough, you can do more strenuous activities with caution. ? You can golf, but use a golf cart, and do not wear shoes with spikes. ? You can bike on a flat road or on a stationary bike. Avoid biking up hills. ? Your doctor may suggest that you stay away from activities that put stress on your knee. These include tennis or badminton, squash or racquetball, contact sports like football, jumping (such as in basketball), jogging, or running. ? Avoid activities where you might fall.  These include horseback riding, skiing, and mountain biking.     · Do not sit for more than 1 hour at a time. Get up and walk around for a while before you sit again. If you must sit for a long time, prop up your leg with a chair or footstool. This will help you avoid swelling.     · Ask your doctor when you can drive again. It may take up to 8 weeks after knee replacement surgery before it is safe for you to drive.     · When you get into a car, sit on the edge of the seat. Then pull in your legs, and turn to face the front.     · You should be able to do many everyday activities 3 to 6 weeks after your surgery. You will probably need to take 4 to 16 weeks off from work. When you can go back to work depends on the type of work you do and how you feel.     · Ask your doctor when it is okay for you to have sex.     · Do not lift anything heavier than 10 pounds and do not lift weights for 12 weeks. Diet    · By the time you leave the hospital, you should be eating your normal diet. If your stomach is upset, try bland, low-fat foods like plain rice, broiled chicken, toast, and yogurt. Your doctor may suggest that you take iron and vitamin supplements.     · Drink plenty of fluids (unless your doctor tells you not to).   · Eat healthy foods, and watch your portion sizes. Try to stay at your ideal weight. Too much weight puts more stress on your new knee.     · You may notice that your bowel movements are not regular right after your surgery. This is common. Try to avoid constipation and straining with bowel movements. You may want to take a fiber supplement every day. If you have not had a bowel movement after a couple of days, ask your doctor about taking a mild laxative. Medicines    · Your doctor will tell you if and when you can restart your medicines. He or she will also give you instructions about taking any new medicines.     · If you take blood thinners, such as warfarin (Coumadin), clopidogrel (Plavix), or aspirin, be sure to talk to your doctor.  He or she will tell you if and when to start taking those medicines again. Make sure that you understand exactly what your doctor wants you to do.     · Your doctor may give you a blood-thinning medicine to prevent blood clots. If you take a blood thinner, be sure you get instructions about how to take your medicine safely. Blood thinners can cause serious bleeding problems. This medicine could be in pill form or as a shot (injection). If a shot is necessary, your doctor will tell you how to do this.     · Be safe with medicines. Take pain medicines exactly as directed. ? If the doctor gave you a prescription medicine for pain, take it as prescribed. ? If you are not taking a prescription pain medicine, ask your doctor if you can take an over-the-counter medicine. ? Plan to take your pain medicine 30 minutes before exercises. It is easier to prevent pain before it starts than to stop it once it has started.     · If you think your pain medicine is making you sick to your stomach:  ? Take your medicine after meals (unless your doctor has told you not to). ? Ask your doctor for a different pain medicine.     · If your doctor prescribed antibiotics, take them as directed. Do not stop taking them just because you feel better. You need to take the full course of antibiotics. Incision care    · If your doctor told you how to care for your cut (incision), follow your doctor's instructions. You will have a dressing over the cut. A dressing helps the incision heal and protects it. Your doctor will tell you how to take care of this.     · If you did not get instructions, follow this general advice:  ? If you have strips of tape on the cut the doctor made, leave the tape on for a week or until it falls off.  ? If you have stitches or staples, your doctor will tell you when to come back to have them removed. ? If you have skin adhesive on the cut, leave it on until it falls off.  Skin adhesive is also called glue or liquid stitches. ? Change the bandage every day. ? Wash the area daily with warm water, and pat it dry. Don't use hydrogen peroxide or alcohol. They can slow healing. ? You may cover the area with a gauze bandage if it oozes fluid or rubs against clothing. ? You may shower 24 to 48 hours after surgery. Pat the incision dry. Don't swim or take a bath for the first 2 weeks, or until your doctor tells you it is okay. Exercise    · Your rehab program will give you a number of exercises to do to help you get back your knee's range of motion and strength. Always do them as your therapist tells you. Ice and elevation    · For pain and swelling, put ice or a cold pack on the area for 10 to 20 minutes at a time. Put a thin cloth between the ice and your skin. Other instructions    · Continue to wear your support stockings as your doctor says. These help to prevent blood clots. The length of time that you will have to wear them depends on your activity level and the amount of swelling.     · You have metal pieces in your knee. These may set off some airport metal detectors. Carry a medical alert card that says you have an artificial joint, just in case. Follow-up care is a key part of your treatment and safety. Be sure to make and go to all appointments, and call your doctor if you are having problems. It's also a good idea to know your test results and keep a list of the medicines you take. When should you call for help? Call 911 anytime you think you may need emergency care. For example, call if:    · You passed out (lost consciousness).     · You have severe trouble breathing.     · You have sudden chest pain and shortness of breath, or you cough up blood.    Call your doctor now or seek immediate medical care if:    · You have signs of infection, such as:  ? Increased pain, swelling, warmth, or redness. ? Red streaks leading from the incision. ? Pus draining from the incision. ?  A fever.     · You have signs of a blood clot, such as:  ? Pain in your calf, back of the knee, thigh, or groin. ? Redness and swelling in your leg or groin.     · Your incision comes open and begins to bleed, or the bleeding increases.     · You have pain that does not get better after you take pain medicine.    Watch closely for changes in your health, and be sure to contact your doctor if:    · You do not have a bowel movement after taking a laxative. Where can you learn more? Go to http://bryant-seven.info/. Enter U686 in the search box to learn more about \"Total Knee Replacement: What to Expect at Home. \"  Current as of: September 20, 2018  Content Version: 11.9  © 9928-8178 [employer]. Care instructions adapted under license by [employer] (which disclaims liability or warranty for this information). If you have questions about a medical condition or this instruction, always ask your healthcare professional. [place] [address] any warranty or liability for your use of this information. These are general instructions for a healthy lifestyle:    No smoking/ No tobacco products/ Avoid exposure to second hand smoke    Surgeon General's Warning:  Quitting smoking now greatly reduces serious risk to your health. Obesity, smoking, and sedentary lifestyle greatly increases your risk for illness    A healthy diet, regular physical exercise & weight monitoring are important for maintaining a healthy lifestyle    You may be retaining fluid if you have a history of heart failure or if you experience any of the following symptoms:  Weight gain of 3 pounds or more overnight or 5 pounds in a week, increased swelling in our hands or feet or shortness of breath while lying flat in bed. Please call your doctor as soon as you notice any of these symptoms; do not wait until your next office visit.     Recognize signs and symptoms of STROKE:    F-face looks uneven    A-arms unable to move or move even    S-speech slurred or non-existent    T-time-call 911 as soon as signs and symptoms begin-DO NOT go       Back to bed or wait to see if you get better-TIME IS BRAIN. The discharge information has been reviewed with the patient. The patient verbalized understanding. Information obtained by :  I understand that if any problems occur once I am at home I am to contact my physician. I understand and acknowledge receipt of the instructions indicated above.                                                                                                                                            Physician's or R.N.'s Signature                                                                  Date/Time                                                                                                                                              Patient or Representative Signature                                                          Date/Time

## 2019-02-20 NOTE — PROGRESS NOTES
Problem: Mobility Impaired (Adult and Pediatric) Goal: *Acute Goals and Plan of Care (Insert Text) GOALS (1-4 days): 
(1.)Ms. Gonzalez will move from supine to sit and sit to supine  in bed with INDEPENDENT. (2.)Ms. Gonzalez will transfer from bed to chair and chair to bed with INDEPENDENT using the least restrictive device. (3.)Ms. Gonzalez will ambulate with INDEPENDENT for 250 feet with the least restrictive device. (4.)Ms. Gonzalez will ambulate up/down 3 steps with bilateral  railing with MINIMAL ASSIST with no device. (5.)Ms. Gonzalez will increase right knee ROM to 5°-80°. 
________________________________________________________________________________________________ Outcome: Progressing Towards Goal 
 
PHYSICAL THERAPY [facility]: Initial Assessment 2/20/2019INPATIENT: Hospital Day: 2 Payor: BLUE CROSS / Plan[place] BLUE [place] BLUE ESSENTIALS ISMAEL / Product Type: ISMAEL /  
  
NAME/AGE/GENDER: Usman Garza is a 66 y.o. female PRIMARY DIAGNOSIS:  Primary osteoarthritis of right knee [M17.11] Procedure(s) and Anesthesia Type: 
   * RIGHT KNEE ARTHROPLASTY TOTAL W/ DEPUY - Spinal (Right) ICD-10: Treatment Diagnosis:  
 · Pain in Right Knee (M25.561) · Stiffness of Right Knee, Not elsewhere classified (M25.661) ASSESSMENT:  
Ms. Milton Bradley presents with decreased independence with functional mobility. Pt performed sit to stand. Pt ambulated in hallway. Pt performed exercises in bedside chair. This section established at most recent assessment PROBLEM LIST (Impairments causing functional limitations): 1. Decreased Strength 2. Decreased Transfer Abilities 3. Decreased Ambulation Ability/Technique 4. Decreased Balance 5. Increased Pain 6. Decreased Activity Tolerance 7. Decreased Flexibility/Joint Mobility INTERVENTIONS PLANNED: (Benefits and precautions of physical therapy have been discussed with the patient.) 1. Bed Mobility 2. Gait Training 3. Home Exercise Program (HEP) 4. Therapeutic Exercise/Strengthening 5. Transfer Training 6. Range of Motion: active/assisted/passive 7. Therapeutic Activities 8. Group Therapy TREATMENT PLAN: Frequency/Duration: Follow patient BID for duration of hospital stay to address above goals. Rehabilitation Potential For Stated Goals: Good RECOMMENDED REHABILITATION/EQUIPMENT: (at time of discharge pending progress): Home Health: Physical Therapy. HISTORY:  
History of Present Injury/Illness (Reason for Referral): Right tka Past Medical History/Comorbidities: Ms. Leon Cates  has a past medical history of Anxiety, Arthritis, Atherosclerosis of native coronary artery of native heart without angina pectoris (2016), Depression, GERD (gastroesophageal reflux disease), Hypercholesterolemia, Hypertension, MI (myocardial infarction) ([facility] [address] [facility] (2018), Sleep apnea, and [facility] Ms. Leon Cates  has a past surgical history that includes hx breast biopsy; hx hysterectomy; hx  section (); hx  section (); hx tubal ligation (); hx heart catheterization (2018); and hx colonoscopy (). Social History/Living Environment:  
Home Environment: Private residence Living Alone: No 
Support Systems: Spouse/Significant Other/Partner(Fiance) Patient Expects to be Discharged to[de-identified] Private residence Prior Level of Function/Work/Activity: 
Independent with ambulation. Number of Personal Factors/Comorbidities that affect the Plan of Care: 0: LOW COMPLEXITY EXAMINATION:  
Most Recent Physical Functioning:  
Gross Assessment: Yes Gross Assessment AROM: Generally decreased, functional 
Strength: Generally decreased, functional 
Coordination: Generally decreased, functional 
 
    RLE AROM 
R Knee Flexion: 60 R Knee Extension: 30 Bed Mobility Supine to Sit: Stand-by assistance Transfers Sit to Stand: Supervision Stand to Sit: Supervision Bed to Chair: Supervision Balance Sitting: Intact Standing: With support    Posture Posture (WDL): Within defined limits Braces/Orthotics: none Right Knee Cold Type: Cryocuff Body Structures Involved: 1. Bones 2. Joints 3. Muscles 4. Ligaments Body Functions Affected: 1. Neuromusculoskeletal 
2. Movement Related Activities and Participation Affected: 1. Mobility Number of elements that affect the Plan of Care: 4+: HIGH COMPLEXITY CLINICAL PRESENTATION:  
Presentation: Stable and uncomplicated: LOW COMPLEXITY CLINICAL DECISION MAKIN[address] [place] [address] AM-PAC 6 Clicks Basic Mobility Inpatient Short Form How much difficulty does the patient currently have. .. Unable A Lot A Little None 1. Turning over in bed (including adjusting bedclothes, sheets and blankets)? [] 1   [] 2   [x] 3   [] 4  
2. Sitting down on and standing up from a chair with arms ( e.g., wheelchair, bedside commode, etc.)   [] 1   [] 2   [x] 3   [] 4  
3. Moving from lying on back to sitting on the side of the bed? [] 1   [] 2   [x] 3   [] 4 How much help from another person does the patient currently need. .. Total A Lot A Little None 4. Moving to and from a bed to a chair (including a wheelchair)? [] 1   [] 2   [x] 3   [] 4  
5. Need to walk in hospital room? [] 1   [] 2   [x] 3   [] 4  
6. Climbing 3-5 steps with a railing? [] 1   [] 2   [x] 3   [] 4  
© , Trustees of [address] [place] [address], under license to [employer]. All rights reserved Score:  Initial: 18 Most Recent: X (Date: -- ) Interpretation of Tool:  Represents activities that are increasingly more difficult (i.e. Bed mobility, Transfers, Gait). Medical Necessity:    
· Skilled intervention continues to be required due to decreased mobility ability. Reason for Services/Other Comments: 
· Patient continues to require skilled intervention due to decreased mobility ability. Use of outcome tool(s) and clinical judgement create a POC that gives a: Clear prediction of patient's progress: LOW COMPLEXITY  
  
 
 
 
TREATMENT:  
(In addition to Assessment/Re-Assessment sessions the following treatments were rendered) Pre-treatment Symptoms/Complaints:   
Pain: Initial:  
Pain Intensity 1: 6 Pain Location 1: Knee Pain Orientation 1: Right  Post Session:  Right knee pain Therapeutic Exercise: (  15 minutes):  Exercises per grid below to improve mobility. Assessment/Reassessment only, no treatment provided today Date: 
2/20 Date: 
 Date: 
  
ACTIVITY/EXERCISE AM PM AM PM AM PM  
GROUP THERAPY  []  []  []  []  []  [] Ankle Pumps 10 Quad Sets 10 Gluteal Sets 10 Hip ABd/ADduction 10 Straight Leg Raises 10 Knee Slides 10 Short Arc [employer] [facility] [address] Chair Slides B = bilateral; AA = active assistive; A = active; P = passive Treatment/Session Assessment:   
 Response to Treatment:  Pt agreeable to ambulate. Education: 
[x] Home Exercises [x] Fall Precautions [] Hip Precautions [] D/C Instruction Review 
[] Knee/Hip Prosthesis Review [x] Walker Management/Safety [] Adaptive Equipment as Needed Interdisciplinary Collaboration:  
o Physical Therapist 
o Occupational Therapist 
o Registered Nurse After treatment position/precautions:  
o Up in chair 
o Bed/Chair-wheels locked 
o Bed in low position 
o Caregiver at bedside 
o Call light within reach 
o RN notified Compliance with Program/Exercises: Will assess as treatment progresses. Recommendations/Intent for next treatment session:  Treatment next visit will focus on increasing Ms. Gonzalez's independence with bed mobility, transfers, gait training, strength/ROM exercises, modalities for pain, and patient education. Total Treatment Duration: PT Patient Time In/Time Out Time In: 0076 Time Out: 7345 Arvin Garber PT

## 2019-02-20 NOTE — PROGRESS NOTES
Pt is alert and oriented x3. Pt in bed with family in the room No NV deficits noted. Pt is able to dorsi/ plantar flex and has +2 pedal pulses. Dressing to surgical incision is clean, dry, and intact. Pain is controlled at this time. Ice man ice pack on right knee Bed is low and locked, call light in reach. IS at bedside and pt demonstrated use. No needs stated.

## 2019-02-20 NOTE — PROGRESS NOTES
Problem: Mobility Impaired (Adult and Pediatric) Goal: *Acute Goals and Plan of Care (Insert Text) GOALS (1-4 days): 
(1.)Ms. Gonzalez will move from supine to sit and sit to supine  in bed with INDEPENDENT. (2.)Ms. Gonzalez will transfer from bed to chair and chair to bed with INDEPENDENT using the least restrictive device. (3.)Ms. Gonzalez will ambulate with INDEPENDENT for 250 feet with the least restrictive device. (4.)Ms. Gonzalez will ambulate up/down 3 steps with bilateral  railing with MINIMAL ASSIST with no device. (5.)Ms. Gonzalez will increase right knee ROM to 5°-80°. 
________________________________________________________________________________________________ Outcome: Progressing Towards Goal 
 
PHYSICAL THERAPY [facility]: Daily Note, Treatment Day: 1st, PM 2/20/2019INPATIENT: Hospital Day: 2 Payor: BLUE CROSS / [place] BLUE ESSENTIALS ISMAEL / Product Type: ISMAEL /  
  
NAME/AGE/GENDER: Hollis Aschoff is a 66 y.o. female PRIMARY DIAGNOSIS:  Primary osteoarthritis of right knee [M17.11] Procedure(s) and Anesthesia Type: 
   * RIGHT KNEE ARTHROPLASTY TOTAL W/ DEPUY - Spinal (Right) ICD-10: Treatment Diagnosis:  
 · Pain in Right Knee (M25.561) · Stiffness of Right Knee, Not elsewhere classified (M25.661) ASSESSMENT:  
Ms. Gale Age presents with decreased independence with functional mobility. Pt performed sit to stand. Pt ambulated in hallway. Pt performed exercises in bedside chair. PM--Pt performed sit to stand. Pt ambulated in hallway. Pt ascends/descends three steps with supervision. This section established at most recent assessment PROBLEM LIST (Impairments causing functional limitations): 1. Decreased Strength 2. Decreased Transfer Abilities 3. Decreased Ambulation Ability/Technique 4. Decreased Balance 5. Increased Pain 6. Decreased Activity Tolerance 7. Decreased Flexibility/Joint Mobility INTERVENTIONS PLANNED: (Benefits and precautions of physical therapy have been discussed with the patient.) 1. Bed Mobility 2. Gait Training 3. Home Exercise Program (HEP) 4. Therapeutic Exercise/Strengthening 5. Transfer Training 6. Range of Motion: active/assisted/passive 7. Therapeutic Activities 8. Group Therapy TREATMENT PLAN: Frequency/Duration: Follow patient BID for duration of hospital stay to address above goals. Rehabilitation Potential For Stated Goals: Good RECOMMENDED REHABILITATION/EQUIPMENT: (at time of discharge pending progress): Home Health: Physical Therapy. HISTORY:  
History of Present Injury/Illness (Reason for Referral): Right tka Past Medical History/Comorbidities: Ms. Sharmila Chris  has a past medical history of Anxiety, Arthritis, Atherosclerosis of native coronary artery of native heart without angina pectoris (2016), Depression, GERD (gastroesophageal reflux disease), Hypercholesterolemia, Hypertension, MI (myocardial infarction) ([facility] [address] (2018), Sleep apnea, and Ventricular fibrillation ([facility] [address] Ms. Sharmila Chris  has a past surgical history that includes hx breast biopsy; hx hysterectomy; hx  section (); hx  section (); hx tubal ligation (); hx heart catheterization (2018); and hx colonoscopy (). Social History/Living Environment:  
Home Environment: Private residence Living Alone: No 
Support Systems: Spouse/Significant Other/Partner(Fiance) Patient Expects to be Discharged to[de-identified] Private residence Prior Level of Function/Work/Activity: 
Independent with ambulation. Number of Personal Factors/Comorbidities that affect the Plan of Care: 0: LOW COMPLEXITY EXAMINATION:  
Most Recent Physical Functioning:  
Gross Assessment: Yes Gross Assessment AROM: Generally decreased, functional 
Strength: Generally decreased, functional 
Coordination: Generally decreased, functional 
 
    RLE AROM 
R Knee Flexion: 100 R Knee Extension: 5 Bed Mobility Supine to Sit: Stand-by assistance Transfers Sit to Stand: Supervision Stand to Sit: Supervision Bed to Chair: Supervision Balance Sitting: Intact Standing: With support    Posture Posture (WDL): Within defined limits Braces/Orthotics: none Right Knee Cold Type: Cryocuff Body Structures Involved: 1. Bones 2. Joints 3. Muscles 4. Ligaments Body Functions Affected: 1. Neuromusculoskeletal 
2. Movement Related Activities and Participation Affected: 1. Mobility Number of elements that affect the Plan of Care: 4+: HIGH COMPLEXITY CLINICAL PRESENTATION:  
Presentation: Stable and uncomplicated: LOW COMPLEXITY CLINICAL DECISION MAKIN[address] [place] [address] AM-PAC 6 Clicks Basic Mobility Inpatient Short Form How much difficulty does the patient currently have. .. Unable A Lot A Little None 1. Turning over in bed (including adjusting bedclothes, sheets and blankets)? [] 1   [] 2   [x] 3   [] 4  
2. Sitting down on and standing up from a chair with arms ( e.g., wheelchair, bedside commode, etc.)   [] 1   [] 2   [x] 3   [] 4  
3. Moving from lying on back to sitting on the side of the bed? [] 1   [] 2   [x] 3   [] 4 How much help from another person does the patient currently need. .. Total A Lot A Little None 4. Moving to and from a bed to a chair (including a wheelchair)? [] 1   [] 2   [x] 3   [] 4  
5. Need to walk in hospital room? [] 1   [] 2   [x] 3   [] 4  
6. Climbing 3-5 steps with a railing? [] 1   [] 2   [x] 3   [] 4  
© 2007, Trustees of [address] [place] [address], under license to [employer]. All rights reserved Score:  Initial: 18 Most Recent: X (Date: -- ) Interpretation of Tool:  Represents activities that are increasingly more difficult (i.e. Bed mobility, Transfers, Gait). Medical Necessity:    
· Skilled intervention continues to be required due to decreased mobility ability. Reason for Services/Other Comments: 
· Patient continues to require skilled intervention due to decreased mobility ability. Use of outcome tool(s) and clinical judgement create a POC that gives a: Clear prediction of patient's progress: LOW COMPLEXITY  
  
 
 
 
TREATMENT:  
(In addition to Assessment/Re-Assessment sessions the following treatments were rendered) Pre-treatment Symptoms/Complaints:   
Pain: Initial:  
Pain Intensity 1: 6 Pain Location 1: Knee Pain Orientation 1: Right  Post Session:  Right knee pain Therapeutic Exercise: (  45 minutes--group):  Exercises per grid below to improve mobility. Gait Training (  15 minutes):  Gait training to improve and/or restore physical functioning as related to mobility. Date: 
2/20 Date: 
 Date: 
  
ACTIVITY/EXERCISE AM PM AM PM AM PM  
GROUP THERAPY  []  [x]  []  []  []  [] Ankle Pumps 10 15 Quad Sets 10 15 Gluteal Sets 10 15 Hip ABd/ADduction 10 15 Straight Leg Raises 10 15 Knee Slides 10 15 Short Arc Quads  15 [address] Chair Slides  15      
        
B = bilateral; AA = active assistive; A = active; P = passive Treatment/Session Assessment:   
 Response to Treatment:  Pt agreeable to ambulate to rehab room. Education: 
[x] Home Exercises [x] Fall Precautions [x] Hip Precautions [] D/C Instruction Review [x] Knee/Hip Prosthesis Review [x] Walker Management/Safety [] Adaptive Equipment as Needed Interdisciplinary Collaboration:  
o Physical Therapist 
o Occupational Therapist 
o Registered Nurse After treatment position/precautions:  
o Up in chair 
o Bed/Chair-wheels locked 
o Bed in low position 
o Caregiver at bedside 
o Call light within reach 
o RN notified Compliance with Program/Exercises: Will assess as treatment progresses.   
 Recommendations/Intent for next treatment session:  Treatment next visit will focus on increasing Ms. Juanaeks's independence with bed mobility, transfers, gait training, strength/ROM exercises, modalities for pain, and patient education. Total Treatment Duration: PT Patient Time In/Time Out Time In: 1300 Time Out: 1400 Ginger Naranjo, PT

## 2019-02-20 NOTE — PROGRESS NOTES
In recliner. Aquacel to R knee with small amount drainage. Iceman to knee. NV checks WDL. Medicated for pain with oxycodone 10 mg.

## 2019-02-20 NOTE — PROGRESS NOTES
02/20/19 6192 Oxygen Therapy O2 Sat (%) 93 % Pulse via Oximetry 66 beats per minute O2 Device Room air O2 Flow Rate (L/min) 0 l/min FIO2 (%) 21 % Patient encouraged to do IS every hour while awake-patient agreed and demonstrated. No shortness of breath or distress noted. BS are clear b/l.

## 2019-02-20 NOTE — PROGRESS NOTES
02/19/19 2135 Oxygen Therapy O2 Sat (%) 95 % Pulse via Oximetry 63 beats per minute O2 Device Nasal cannula O2 Flow Rate (L/min) 2 l/min Patient placed on continuous sat monitor (no #). Alarms set and data cleared. No distress noted at this time.

## 2019-02-20 NOTE — PROGRESS NOTES
Had group therapy. Prescription given for oxycodone and instructed how to take. Has follow up appt already scheduled with Dr Hardeep Packer. [facility] to [facility] pt for therapy. Instructed to call doctor if having fever, excessive drainage, numbness or other problems. I have reviewed discharge instructions with the patient. The patient verbalized understanding. Going to car via wheelchair. Discharged home with .

## 2019-02-20 NOTE — PROGRESS NOTES
I am accessing Ms. Gonzalez's chart as a part of our department's internal chart auditing process. I certify that Ms. Gonzalez is, or was, a patient in our department. Thank you, 
Michelle Newberry OT 
2/20/2019

## 2019-02-20 NOTE — DISCHARGE SUMMARY
[address] [place] [address]  Total Joint Discharge Summary      Patient ID:  Lashon Hernandez  [id number]  51 y.o.  1960    Admit date: 2/19/2019  Discharge date and time: 2/20/19  Admitting Physician: Zach Lei MD  Surgeon: Same  Admission Diagnoses: Primary osteoarthritis of right knee [M17.11]  Arthritis of knee, right [M17.11]  Discharge Diagnoses: Principal Problem:    S/P TKR (total knee replacement) using cement, right (2/20/2019)    Active Problems:    Arthritis of knee, right (2/19/2019)                                Perioperative Antibiotics: Ancef 1 to 2 mg was given depending on patient's weight. If allergic to Ancef or due to other indications, patient was given Vancomycin. Hospital Medications given:   [unfilled]  [unfilled]  [unfilled]    Discharge Medications given:  Current Discharge Medication List      START taking these medications    Details   oxyCODONE IR (ROXICODONE) 5 mg immediate release tablet Take 1 Tab by mouth every four (4) hours as needed. Max Daily Amount: 30 mg.  Qty: 60 Tab, Refills: 0    Associated Diagnoses: S/P TKR (total knee replacement) using cement, right         CONTINUE these medications which have CHANGED    Details   aspirin delayed-release 81 mg tablet Take 1 Tab by mouth every twelve (12) hours every twelve (12) hours for 30 days. Qty: 60 Tab, Refills: 0         CONTINUE these medications which have NOT CHANGED    Details   traZODone (DESYREL) 100 mg tablet Take 1 Tab by mouth nightly. Qty: 30 Tab, Refills: 2      sertraline (ZOLOFT) 100 mg tablet Take 1 Tab by mouth two (2) times a day. Qty: 60 Tab, Refills: 2      buPROPion XL (WELLBUTRIN XL) 300 mg XL tablet Take 1 Tab by mouth every morning. Qty: 30 Tab, Refills: 2      metoprolol succinate (TOPROL-XL) 50 mg XL tablet Take 0.5 Tabs by mouth daily.   Qty: 90 Tab, Refills: 1    Associated Diagnoses: Essential hypertension      losartan (COZAAR) 25 mg tablet Take 1 Tab by mouth daily.  Qty: 90 Tab, Refills: 1    Associated Diagnoses: Essential hypertension      atorvastatin (LIPITOR) 80 mg tablet TAKE ONE TABLET BY MOUTH EVERY EVENING  Qty: 90 Tab, Refills: 1    Associated Diagnoses: Familial hypercholesterolemia      hydroCHLOROthiazide (HYDRODIURIL) 12.5 mg tablet Take 1 Tab by mouth daily. Qty: 90 Tab, Refills: 1    Associated Diagnoses: Essential hypertension      ticagrelor (BRILINTA) 90 mg tablet Take 1 Tab by mouth every twelve (12) hours every twelve (12) hours. Qty: 60 Tab, Refills: 11      Comp Stocking,Knee,Regular,Sml (T.E.D. ANTI-EMBOLISM STOCKING) misc 1 Package by Does Not Apply route daily. Qty: 2 Package, Refills: 1    Associated Diagnoses: Pedal edema      lidocaine (XYLOCAINE) 5 % ointment       clonazePAM (KLONOPIN) 0.5 mg tablet Take 1 Tab by mouth two (2) times daily as needed. Max Daily Amount: 1 mg. 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 ergocalciferol (ERGOCALCIFEROL) 50,000 unit capsule Take 1 Cap by mouth every seven (7) days. Qty: 90 Cap, Refills: 1    Associated Diagnoses: Vitamin D deficiency      nitroglycerin (NITROSTAT) 0.4 mg SL tablet 1 Tab by SubLINGual route every five (5) minutes as needed for Chest Pain. Indications: Angina  Qty: 25 Tab, Refills: 11              Additional DVT Prophylaxis:  MAYNOR Hose,Plexi-Pulse    Postoperative transfusions:   none  Post Op complications: none    Hemoglobin at discharge:   Lab Results   Component Value Date/Time    HGB 10.4 (L) 02/19/2019 07:53 PM       Wound appears to be healing without any evidence of infection. Physical Therapy started on the day following surgery and progressed to independent ambulation with the aid of a walker.   At the time of discharge, able to go up and down stairs and had understanding of precautions needed following surgery.       PT/OT:            Assistive Device: Walker (comment)                Discharged to: home    Discharge instructions:  -Rx pain medication given  - Anticoagulate with: Ecotrin 81 mg PO BID x 4 weeks  -Resume pre hospital diet             -Resume home medications per medical continuation form     -Ambulate with walker, appropriate total joint protocol  -Follow up in office as scheduled       Signed:  Genevie Saint, MD  2/20/2019  7:43 AM

## 2019-02-21 NOTE — PROGRESS NOTES
Care Management Interventions Mode of Transport at Discharge: Self Transition of Care Consult (CM Consult): Home Health [address]: Yes Discharge Durable Medical Equipment: No 
Physical Therapy Consult: Yes Occupational Therapy Consult: Yes Current Support Network: Own Home Confirm Follow Up Transport: Family Plan discussed with Pt/Family/Caregiver: Yes Freedom of Choice Offered: Yes Discharge Location Discharge Placement: Home with home health Patient is a 66y.o. year old female admitted for Right TKA . Patient lives with Her spouse and plans to return home on discharge. Order received to arrange home health. Patient without preference towards agency. Referral sent to [facility]. Patient denies any equipment needs as she has a walker and raised toilet seat. Will follow until discharge.

## 2019-02-22 ENCOUNTER — HOME CARE VISIT (OUTPATIENT)
Dept: SCHEDULING | Facility: HOME HEALTH | Age: 59
End: 2019-02-22
Payer: COMMERCIAL

## 2019-02-22 VITALS
DIASTOLIC BLOOD PRESSURE: 80 MMHG | HEART RATE: 72 BPM | SYSTOLIC BLOOD PRESSURE: 134 MMHG | TEMPERATURE: 98.4 F | RESPIRATION RATE: 16 BRPM

## 2019-02-22 PROCEDURE — G0151 HHCP-SERV OF PT,EA 15 MIN: HCPCS

## 2019-02-22 PROCEDURE — 400013 HH SOC

## 2019-02-25 ENCOUNTER — HOME CARE VISIT (OUTPATIENT)
Dept: SCHEDULING | Facility: HOME HEALTH | Age: 59
End: 2019-02-25
Payer: COMMERCIAL

## 2019-02-25 PROCEDURE — G0157 HHC PT ASSISTANT EA 15: HCPCS

## 2019-02-26 VITALS
TEMPERATURE: 97.8 F | DIASTOLIC BLOOD PRESSURE: 62 MMHG | HEART RATE: 70 BPM | RESPIRATION RATE: 18 BRPM | SYSTOLIC BLOOD PRESSURE: 108 MMHG

## 2019-02-27 ENCOUNTER — HOME CARE VISIT (OUTPATIENT)
Dept: SCHEDULING | Facility: HOME HEALTH | Age: 59
End: 2019-02-27
Payer: COMMERCIAL

## 2019-02-27 PROCEDURE — G0157 HHC PT ASSISTANT EA 15: HCPCS

## 2019-02-28 VITALS
RESPIRATION RATE: 18 BRPM | TEMPERATURE: 97.6 F | DIASTOLIC BLOOD PRESSURE: 64 MMHG | HEART RATE: 74 BPM | SYSTOLIC BLOOD PRESSURE: 122 MMHG

## 2019-03-01 ENCOUNTER — HOME CARE VISIT (OUTPATIENT)
Dept: SCHEDULING | Facility: HOME HEALTH | Age: 59
End: 2019-03-01
Payer: COMMERCIAL

## 2019-03-01 PROCEDURE — G0157 HHC PT ASSISTANT EA 15: HCPCS

## 2019-03-01 PROCEDURE — A6258 TRANSPARENT FILM >16<=48 IN: HCPCS

## 2019-03-03 VITALS
DIASTOLIC BLOOD PRESSURE: 68 MMHG | SYSTOLIC BLOOD PRESSURE: 110 MMHG | RESPIRATION RATE: 18 BRPM | HEART RATE: 72 BPM | TEMPERATURE: 97.1 F

## 2019-03-05 ENCOUNTER — HOME CARE VISIT (OUTPATIENT)
Dept: SCHEDULING | Facility: HOME HEALTH | Age: 59
End: 2019-03-05
Payer: COMMERCIAL

## 2019-03-05 PROCEDURE — G0157 HHC PT ASSISTANT EA 15: HCPCS

## 2019-03-06 VITALS
HEART RATE: 70 BPM | DIASTOLIC BLOOD PRESSURE: 78 MMHG | RESPIRATION RATE: 18 BRPM | TEMPERATURE: 97.7 F | SYSTOLIC BLOOD PRESSURE: 128 MMHG

## 2019-03-07 PROCEDURE — A4649 SURGICAL SUPPLIES: HCPCS

## 2019-03-08 ENCOUNTER — HOME CARE VISIT (OUTPATIENT)
Dept: SCHEDULING | Facility: HOME HEALTH | Age: 59
End: 2019-03-08
Payer: COMMERCIAL

## 2019-03-08 PROCEDURE — G0157 HHC PT ASSISTANT EA 15: HCPCS

## 2019-03-10 VITALS
HEART RATE: 92 BPM | DIASTOLIC BLOOD PRESSURE: 62 MMHG | TEMPERATURE: 97.1 F | SYSTOLIC BLOOD PRESSURE: 110 MMHG | RESPIRATION RATE: 18 BRPM

## 2019-03-12 ENCOUNTER — HOME CARE VISIT (OUTPATIENT)
Dept: SCHEDULING | Facility: HOME HEALTH | Age: 59
End: 2019-03-12
Payer: COMMERCIAL

## 2019-03-12 VITALS
HEART RATE: 84 BPM | SYSTOLIC BLOOD PRESSURE: 118 MMHG | DIASTOLIC BLOOD PRESSURE: 70 MMHG | TEMPERATURE: 97.7 F | RESPIRATION RATE: 18 BRPM

## 2019-03-12 PROCEDURE — G0157 HHC PT ASSISTANT EA 15: HCPCS

## 2019-03-15 ENCOUNTER — HOME CARE VISIT (OUTPATIENT)
Dept: SCHEDULING | Facility: HOME HEALTH | Age: 59
End: 2019-03-15
Payer: COMMERCIAL

## 2019-03-15 PROCEDURE — G0151 HHCP-SERV OF PT,EA 15 MIN: HCPCS

## 2019-03-16 VITALS
RESPIRATION RATE: 20 BRPM | HEART RATE: 79 BPM | TEMPERATURE: 97.9 F | SYSTOLIC BLOOD PRESSURE: 132 MMHG | DIASTOLIC BLOOD PRESSURE: 76 MMHG

## 2019-05-31 PROBLEM — Z95.5 S/P CORONARY ARTERY STENT PLACEMENT: Status: ACTIVE | Noted: 2019-05-31

## 2019-09-23 PROBLEM — Z01.810 PREOP CARDIOVASCULAR EXAM: Status: RESOLVED | Noted: 2018-11-01 | Resolved: 2019-09-23

## 2020-01-07 PROBLEM — G45.9 TIA (TRANSIENT ISCHEMIC ATTACK): Status: ACTIVE | Noted: 2019-05-11

## 2020-01-07 PROBLEM — E78.5 HYPERLIPIDEMIA: Status: ACTIVE | Noted: 2019-05-06

## 2020-01-07 PROBLEM — D12.3 BENIGN NEOPLASM OF TRANSVERSE COLON: Status: ACTIVE | Noted: 2020-01-07

## 2020-01-07 PROBLEM — I10 HYPERTENSION: Status: ACTIVE | Noted: 2019-05-06

## 2020-01-07 PROBLEM — R73.03 PREDIABETES: Status: ACTIVE | Noted: 2019-05-06

## 2020-01-07 PROBLEM — F32.A DEPRESSION: Status: ACTIVE | Noted: 2019-05-06

## 2021-03-10 PROBLEM — I21.3 STEMI (ST ELEVATION MYOCARDIAL INFARCTION) (HCC): Status: RESOLVED | Noted: 2018-01-01 | Resolved: 2021-03-10

## 2022-03-02 PROBLEM — R07.2 PRECORDIAL PAIN: Status: ACTIVE | Noted: 2022-03-02

## 2022-03-18 PROBLEM — Z96.651 S/P TKR (TOTAL KNEE REPLACEMENT) USING CEMENT, RIGHT: Status: ACTIVE | Noted: 2019-02-20

## 2022-03-18 PROBLEM — F33.9 RECURRENT MAJOR DEPRESSIVE DISORDER (HCC): Status: ACTIVE | Noted: 2017-08-15

## 2022-03-18 PROBLEM — R07.2 PRECORDIAL PAIN: Status: ACTIVE | Noted: 2022-03-02

## 2022-03-18 PROBLEM — I10 HYPERTENSION: Status: ACTIVE | Noted: 2019-05-06

## 2022-03-18 PROBLEM — F32.A DEPRESSION: Status: ACTIVE | Noted: 2019-05-06

## 2022-03-18 PROBLEM — Z95.5 S/P CORONARY ARTERY STENT PLACEMENT: Status: ACTIVE | Noted: 2019-05-31

## 2022-03-19 PROBLEM — I49.01 VENTRICULAR FIBRILLATION (HCC): Status: ACTIVE | Noted: 2018-01-01

## 2022-03-19 PROBLEM — G47.00 INSOMNIA: Status: ACTIVE | Noted: 2017-01-11

## 2022-03-19 PROBLEM — Z72.0 TOBACCO USE: Status: ACTIVE | Noted: 2018-01-01

## 2022-03-19 PROBLEM — E78.5 HYPERLIPIDEMIA: Status: ACTIVE | Noted: 2019-05-06

## 2022-03-19 PROBLEM — F06.4 ANXIETY DISORDER DUE TO GENERAL MEDICAL CONDITION: Status: ACTIVE | Noted: 2018-08-05

## 2022-03-19 PROBLEM — D12.3 BENIGN NEOPLASM OF TRANSVERSE COLON: Status: ACTIVE | Noted: 2020-01-07

## 2022-03-19 PROBLEM — G45.9 TIA (TRANSIENT ISCHEMIC ATTACK): Status: ACTIVE | Noted: 2019-05-11

## 2022-03-20 PROBLEM — F41.1 GENERALIZED ANXIETY DISORDER: Status: ACTIVE | Noted: 2017-08-15

## 2022-03-20 PROBLEM — M17.11 ARTHRITIS OF KNEE, RIGHT: Status: ACTIVE | Noted: 2019-02-19

## 2022-03-20 PROBLEM — I21.19 ST ELEVATION MYOCARDIAL INFARCTION (STEMI) OF INFERIOR WALL (HCC): Status: ACTIVE | Noted: 2018-01-01

## 2022-03-20 PROBLEM — R73.03 PREDIABETES: Status: ACTIVE | Noted: 2019-05-06

## 2022-10-18 ENCOUNTER — OFFICE VISIT (OUTPATIENT)
Dept: CARDIOLOGY CLINIC | Age: 62
End: 2022-10-18
Payer: COMMERCIAL

## 2022-10-18 VITALS
WEIGHT: 188.2 LBS | BODY MASS INDEX: 32.13 KG/M2 | DIASTOLIC BLOOD PRESSURE: 74 MMHG | HEIGHT: 64 IN | HEART RATE: 80 BPM | SYSTOLIC BLOOD PRESSURE: 124 MMHG

## 2022-10-18 DIAGNOSIS — E78.2 MIXED HYPERLIPIDEMIA: ICD-10-CM

## 2022-10-18 DIAGNOSIS — I25.10 ATHEROSCLEROSIS OF NATIVE CORONARY ARTERY OF NATIVE HEART WITHOUT ANGINA PECTORIS: ICD-10-CM

## 2022-10-18 DIAGNOSIS — Z72.0 TOBACCO USE: ICD-10-CM

## 2022-10-18 DIAGNOSIS — Z95.5 S/P CORONARY ARTERY STENT PLACEMENT: ICD-10-CM

## 2022-10-18 DIAGNOSIS — I10 PRIMARY HYPERTENSION: Primary | ICD-10-CM

## 2022-10-18 PROBLEM — I21.19 ST ELEVATION MYOCARDIAL INFARCTION (STEMI) OF INFERIOR WALL (HCC): Status: RESOLVED | Noted: 2018-01-01 | Resolved: 2022-10-18

## 2022-10-18 PROBLEM — R07.2 PRECORDIAL PAIN: Status: RESOLVED | Noted: 2022-03-02 | Resolved: 2022-10-18

## 2022-10-18 PROBLEM — I49.01 VENTRICULAR FIBRILLATION (HCC): Status: RESOLVED | Noted: 2018-01-01 | Resolved: 2022-10-18

## 2022-10-18 PROCEDURE — 99214 OFFICE O/P EST MOD 30 MIN: CPT | Performed by: INTERNAL MEDICINE

## 2022-10-18 RX ORDER — NITROGLYCERIN 0.4 MG/1
0.4 TABLET SUBLINGUAL EVERY 5 MIN PRN
Qty: 25 TABLET | Refills: 3 | Status: SHIPPED | OUTPATIENT
Start: 2022-10-18

## 2022-10-18 RX ORDER — EZETIMIBE 10 MG/1
10 TABLET ORAL DAILY
Qty: 30 TABLET | Refills: 11 | Status: SHIPPED | OUTPATIENT
Start: 2022-10-18

## 2022-10-18 ASSESSMENT — ENCOUNTER SYMPTOMS
GASTROINTESTINAL NEGATIVE: 1
PHOTOPHOBIA: 0
EYE PAIN: 0
CHEST TIGHTNESS: 0
RESPIRATORY NEGATIVE: 1
EYES NEGATIVE: 1
ALLERGIC/IMMUNOLOGIC NEGATIVE: 1
ABDOMINAL PAIN: 0
SHORTNESS OF BREATH: 0
BACK PAIN: 0

## 2022-10-18 NOTE — PROGRESS NOTES
[facility] CARDIOLOGY  [address], [address]  PHONE: [phone]      10/18/22    NAME:  Maurilio Resendiz  : 1960  MRN: [id number]         SUBJECTIVE:   Maurilio Resendiz is a 63 y.o. female seen for follow up of:      Chief Complaint   Patient presents with    Hypertension     6 month follow up          Cardiac Hx (Reviewed and summarized by me): MARIAM Contreras pt   1) HTN   2) CAD               Cath 2018 with PCI to mRCA 3.0 x 28 mm [facility] ([place])              NM stress test 3/14/22 - normal perfusion   3) Echo               2018 LVEF 55-60% with normal dfun   4) Lipids              19 - HDL 33, LDL 66, Trig 100              19 - HDL 36, LDL 82, Trig 56              3/10/21 - HDL 34, LDL 86, Trig 88              22 - HDL 36, LDL 77, Trig 116   7/15/22 - HDL 38, LDL 98, Trig 107    HPI:  Doing well. Denies chest pain palpitations orthopnea PND lower extremity edema. Compliant with medications. Last lipid panel shows LDL well above 70. Past Medical History, Past Surgical History, Family history, Social History, and Medications were all reviewed with the patient today and updated as necessary.        Current Outpatient Medications:     nitroGLYCERIN (NITROSTAT) 0.4 MG SL tablet, Place 1 tablet under the tongue every 5 minutes as needed for Chest pain, Disp: 25 tablet, Rfl: 3    ezetimibe (ZETIA) 10 MG tablet, Take 1 tablet by mouth daily, Disp: 30 tablet, Rfl: 11    aspirin 81 MG EC tablet, Take 81 mg by mouth daily, Disp: , Rfl:     atorvastatin (LIPITOR) 80 MG tablet, TAKE ONE TABLET BY MOUTH EVERY EVENING, Disp: , Rfl:     buPROPion (WELLBUTRIN XL) 300 MG extended release tablet, Take 300 mg by mouth, Disp: , Rfl:     diclofenac (VOLTAREN) 75 MG EC tablet, Take 75 mg by mouth daily as needed, Disp: , Rfl:     hydroCHLOROthiazide (HYDRODIURIL) 25 MG tablet, Take 25 mg by mouth daily, Disp: , Rfl:     sertraline (ZOLOFT) 100 MG tablet, Take 200 mg by mouth daily, Disp: , Rfl:     traZODone (DESYREL) 100 MG tablet, Take 100 mg by mouth, Disp: , Rfl:     valsartan (DIOVAN) 80 MG tablet, Take 80 mg by mouth daily, Disp: , Rfl:   Allergies   Allergen Reactions    Ace Inhibitors Other (See Comments)    Codeine Itching    Iodine Hives     Contrast Media    Sulfa Antibiotics Hives and Rash     Past Medical History:   Diagnosis Date    Anxiety     Managed with meds     Arthritis     Atherosclerosis of native coronary artery of native heart without angina pectoris 2016    Obstructive 1-vessel coronary artery disease involving the mid RCA. Successful PCI and stenting of the mid RCA with a 3.0 x 28 mm Synergy drug-eluting stent. Depression     Managed with meds     GERD (gastroesophageal reflux disease)     Managed with OTC meds PRN     Hypercholesterolemia     Daily meds     Hypertension     Managed with meds     MI (myocardial infarction) ([address] 2018    Successful PCI and stenting of the mid RCA with a 3.0 x 28 mm Synergy drug-eluting stent. Followed by [facility] cardiology. Osteoarthritis     Sleep apnea     Pt states resolved. Pt states symptoms were from lisinopril.      Ventricular fibrillation ([address]     Only once during heart cath      Past Surgical History:   Procedure Laterality Date    BREAST BIOPSY      right biopsy    CARDIAC CATHETERIZATION  2018    stent placed     [address]    COLONOSCOPY  2017    HYSTERECTOMY (CERVIX STATUS UNKNOWN)      TOTAL KNEE ARTHROPLASTY  2019    right    TUBAL LIGATION  1993     Family History   Problem Relation Age of Onset    Breast Cancer Neg Hx     Hypertension Mother     Thyroid Disease Mother     No Known Problems Father      Social History     Tobacco Use    Smoking status: Former     Packs/day: 0.25     Types: Cigarettes     Quit date: 2015     Years since quittin.8    Smokeless tobacco: Former   Substance Use Topics    Alcohol use: No       ROS:  Review of Systems   Constitutional: Negative. Negative for fever. HENT:  Negative for hearing loss, nosebleeds and tinnitus. Eyes: Negative. Negative for photophobia and pain. Respiratory: Negative. Negative for chest tightness and shortness of breath. Cardiovascular: Negative. Negative for chest pain, palpitations and leg swelling. Gastrointestinal: Negative. Negative for abdominal pain. Endocrine: Negative. Negative for cold intolerance and heat intolerance. Genitourinary: Negative. Negative for dysuria. Musculoskeletal: Negative. Negative for back pain and joint swelling. Skin: Negative. Negative for rash. Allergic/Immunologic: Negative. Negative for immunocompromised state. Neurological: Negative. Negative for dizziness, syncope and light-headedness. Hematological: Negative. Does not bruise/bleed easily. Psychiatric/Behavioral: Negative. Negative for suicidal ideas. PHYSICAL EXAM:  Physical Exam  Constitutional:       General: She is not in acute distress. Appearance: She is not ill-appearing. HENT:      Head: Normocephalic and atraumatic. Nose: No congestion. Mouth/Throat:      Mouth: Mucous membranes are moist.   Eyes:      Extraocular Movements: Extraocular movements intact. Pupils: Pupils are equal, round, and reactive to light. Cardiovascular:      Rate and Rhythm: Normal rate and regular rhythm. Heart sounds: No murmur heard. No friction rub. No gallop. Pulmonary:      Effort: No respiratory distress. Breath sounds: No wheezing or rhonchi. Musculoskeletal:         General: No swelling. Cervical back: Normal range of motion. Right lower leg: No edema. Left lower leg: No edema. Skin:     General: Skin is warm and dry. Findings: No rash. Neurological:      General: No focal deficit present. Mental Status: She is oriented to person, place, and time.    Psychiatric:         Mood and Affect: Mood normal. Behavior: Behavior normal.         Judgment: Judgment normal.        /74   Pulse 80   Ht 5' 4\" (1.626 m)   Wt 188 lb 3.2 oz (85.4 kg)   BMI 32.30 kg/m²      Wt Readings from Last 10 Encounters:   10/18/22 188 lb 3.2 oz (85.4 kg)   04/04/22 191 lb 6.4 oz (86.8 kg)   03/14/22 191 lb (86.6 kg)   03/02/22 191 lb 12.8 oz (87 kg)   09/14/21 185 lb 9.6 oz (84.2 kg)   03/10/21 182 lb (82.6 kg)           Medical problems and test results were reviewed with the patient today. No results found for: BUN, BUNPOC, IBUN  No results found for: ERIC, CREAPOC, CREA  No results found for: K, PLK, WBK, KPOCT    No results found for: CHOL, CHOLPOCT, CHOLX, CHLST, CHOLV, HDL, HDLPOC, HDLC, LDL, LDLC, VLDLC, VLDL, TGLX, TRIGL    ASSESSMENT and PLAN    ICD-10-CM    1. Primary hypertension  I10       2. Atherosclerosis of native coronary artery of native heart without angina pectoris  I25.10       3. Tobacco use  Z72.0       4. Mixed hyperlipidemia  E78.2       5. S/P coronary artery stent placement  Z95.5           IMPRESSION:   1) CAD -aspirin 81 mg daily and atorvastatin 80 mg daily   2) Lipids -add Zetia 10 mg daily to atorvastatin. 3) HTN - controlled on cozaar 25 mg daily, HCTZ 25 mg daily   4) Atypical CP -no further episodes stress test was normal continue current therapies  5) Tobacco abuse - discussed quiting      ALL ORDERS THIS ENCOUNTER  Orders Placed This Encounter    nitroGLYCERIN (NITROSTAT) 0.4 MG SL tablet     Sig: Place 1 tablet under the tongue every 5 minutes as needed for Chest pain     Dispense:  25 tablet     Refill:  3    ezetimibe (ZETIA) 10 MG tablet     Sig: Take 1 tablet by mouth daily     Dispense:  30 tablet     Refill:  11          Follow up in 6 months. Thank you for allowing me to participate in this patient's care. Please call or contact me if there are any questions or concerns regarding the above.       Randall Chavez MD  10/18/22  11:12 AM

## 2023-03-21 ENCOUNTER — TELEPHONE (OUTPATIENT)
Dept: CARDIOLOGY CLINIC | Age: 63
End: 2023-03-21

## 2023-03-21 NOTE — TELEPHONE ENCOUNTER
I left the pt a message. I have a paper here from [facility] that she needs to sign. (Verification Chronic condition) needs to be sent in by 3-31-23.

## 2023-03-22 NOTE — TELEPHONE ENCOUNTER
Pt went to the [facility] office. Tari Mar called me and I faxed the paper there. Pt filled it out and the paper was faxed back to me. I will have Dr. Dereck Warren sign it tomorrow and then fax it to were it needs to go.

## 2023-04-18 ENCOUNTER — OFFICE VISIT (OUTPATIENT)
Dept: CARDIOLOGY CLINIC | Age: 63
End: 2023-04-18
Payer: COMMERCIAL

## 2023-04-18 VITALS
SYSTOLIC BLOOD PRESSURE: 121 MMHG | WEIGHT: 193.8 LBS | DIASTOLIC BLOOD PRESSURE: 80 MMHG | HEART RATE: 90 BPM | BODY MASS INDEX: 33.27 KG/M2

## 2023-04-18 DIAGNOSIS — I25.10 ATHEROSCLEROSIS OF NATIVE CORONARY ARTERY OF NATIVE HEART WITHOUT ANGINA PECTORIS: ICD-10-CM

## 2023-04-18 DIAGNOSIS — I10 PRIMARY HYPERTENSION: Primary | ICD-10-CM

## 2023-04-18 PROCEDURE — 99214 OFFICE O/P EST MOD 30 MIN: CPT | Performed by: INTERNAL MEDICINE

## 2023-04-18 PROCEDURE — 93000 ELECTROCARDIOGRAM COMPLETE: CPT | Performed by: INTERNAL MEDICINE

## 2023-04-18 PROCEDURE — 3079F DIAST BP 80-89 MM HG: CPT | Performed by: INTERNAL MEDICINE

## 2023-04-18 PROCEDURE — 3074F SYST BP LT 130 MM HG: CPT | Performed by: INTERNAL MEDICINE

## 2023-04-18 ASSESSMENT — ENCOUNTER SYMPTOMS
SHORTNESS OF BREATH: 0
ABDOMINAL PAIN: 0
EYES NEGATIVE: 1
PHOTOPHOBIA: 0
GASTROINTESTINAL NEGATIVE: 1
BACK PAIN: 0
RESPIRATORY NEGATIVE: 1
CHEST TIGHTNESS: 0
EYE PAIN: 0
ALLERGIC/IMMUNOLOGIC NEGATIVE: 1

## 2023-04-18 NOTE — PROGRESS NOTES
[facility] CARDIOLOGY  [address] [place] [address], [address] [facility], [address] [place] [address]  PHONE: [phone]      23    NAME:  Denzil Osler  : 1960  MRN: [id number]         SUBJECTIVE:   Denzil Osler is a 63 y.o. female seen for follow up of:      Chief Complaint   Patient presents with    Hypertension    Hyperlipidemia     6mth follow up         Cardiac Hx (Reviewed and summarized by me): MARIAM Contreras pt  1) HTN  2) CAD              Cath 2018 with PCI to mRCA 3.0 x 28 mm [facility] ([facility])              NM stress test 3/14/22 - normal perfusion  3) Echo              2018 LVEF 55-60% with normal dfun  4) Lipids              19 - HDL 33, LDL 66, Trig 100              19 - HDL 36, LDL 82, Trig 56              3/10/21 - HDL 34, LDL 86, Trig 88              22 - HDL 36, LDL 77, Trig 116              7/15/22 - HDL 38, LDL 98, Trig 107   1/10/23 - HDL 41, LDL 62, Trig 79    ECG: SR with nonspecific twave changes no ischemia (Independent review/interpretation by me)      HPI:  Doing well. Denies chest pain palpitations orthopnea PND lower extremity edema. Blood pressures well controlled. Last lipid panel was reviewed and listed above. Compliant with medications. Past Medical History, Past Surgical History, Family history, Social History, and Medications were all reviewed with the patient today and updated as necessary.        Current Outpatient Medications:     nitroGLYCERIN (NITROSTAT) 0.4 MG SL tablet, Place 1 tablet under the tongue every 5 minutes as needed for Chest pain, Disp: 25 tablet, Rfl: 3    aspirin 81 MG EC tablet, Take 1 tablet by mouth daily, Disp: , Rfl:     atorvastatin (LIPITOR) 80 MG tablet, TAKE ONE TABLET BY MOUTH EVERY EVENING, Disp: , Rfl:     buPROPion (WELLBUTRIN XL) 300 MG extended release tablet, Take 1 tablet by mouth, Disp: , Rfl:     diclofenac (VOLTAREN) 75 MG EC tablet, Take 1 tablet by mouth daily as needed, Disp: , Rfl:     hydroCHLOROthiazide

## 2023-05-01 ENCOUNTER — TELEPHONE (OUTPATIENT)
Dept: CARDIOLOGY CLINIC | Age: 63
End: 2023-05-01

## 2023-05-01 NOTE — TELEPHONE ENCOUNTER
Patient states that when she stopped Atorvastatin her muscle craps stopped. Dr. Dorado Record had ask her to call and let him know.

## 2023-05-02 NOTE — TELEPHONE ENCOUNTER
Called and informed pt of Dr Lonnie Wayne response. Pt states that she will try the Repatha. Does not feel like she needs an appointment prior to starting the [address] [place] [address]. Repatha 140mg/ml #2  inject into skin SQ every 14 days  w/RF's eScribed to preferred [facility].

## 2023-05-04 ENCOUNTER — TELEPHONE (OUTPATIENT)
Dept: CARDIOLOGY CLINIC | Age: 63
End: 2023-05-04

## 2023-05-04 NOTE — TELEPHONE ENCOUNTER
I called the pt and asked if she had checked with the pharmacy. Pt stated no. I explained the prescription was sent in on 5-2-23. Pt verbalized understanding.

## 2023-09-25 RX ORDER — EVOLOCUMAB 140 MG/ML
INJECTION, SOLUTION SUBCUTANEOUS
Qty: 2 ML | Refills: 11 | Status: SHIPPED | OUTPATIENT
Start: 2023-09-25

## 2023-10-23 ENCOUNTER — OFFICE VISIT (OUTPATIENT)
Age: 63
End: 2023-10-23
Payer: MEDICARE

## 2023-10-23 VITALS
BODY MASS INDEX: 32.78 KG/M2 | DIASTOLIC BLOOD PRESSURE: 72 MMHG | SYSTOLIC BLOOD PRESSURE: 120 MMHG | WEIGHT: 192 LBS | HEART RATE: 80 BPM | HEIGHT: 64 IN

## 2023-10-23 DIAGNOSIS — I25.10 ATHEROSCLEROSIS OF NATIVE CORONARY ARTERY OF NATIVE HEART WITHOUT ANGINA PECTORIS: ICD-10-CM

## 2023-10-23 DIAGNOSIS — I10 PRIMARY HYPERTENSION: Primary | ICD-10-CM

## 2023-10-23 DIAGNOSIS — E78.2 MIXED HYPERLIPIDEMIA: ICD-10-CM

## 2023-10-23 PROCEDURE — G8427 DOCREV CUR MEDS BY ELIG CLIN: HCPCS | Performed by: INTERNAL MEDICINE

## 2023-10-23 PROCEDURE — 3074F SYST BP LT 130 MM HG: CPT | Performed by: INTERNAL MEDICINE

## 2023-10-23 PROCEDURE — 3017F COLORECTAL CA SCREEN DOC REV: CPT | Performed by: INTERNAL MEDICINE

## 2023-10-23 PROCEDURE — G8417 CALC BMI ABV UP PARAM F/U: HCPCS | Performed by: INTERNAL MEDICINE

## 2023-10-23 PROCEDURE — G8484 FLU IMMUNIZE NO ADMIN: HCPCS | Performed by: INTERNAL MEDICINE

## 2023-10-23 PROCEDURE — 99214 OFFICE O/P EST MOD 30 MIN: CPT | Performed by: INTERNAL MEDICINE

## 2023-10-23 PROCEDURE — 1036F TOBACCO NON-USER: CPT | Performed by: INTERNAL MEDICINE

## 2023-10-23 PROCEDURE — 3078F DIAST BP <80 MM HG: CPT | Performed by: INTERNAL MEDICINE

## 2023-10-23 ASSESSMENT — ENCOUNTER SYMPTOMS
CHEST TIGHTNESS: 0
ABDOMINAL PAIN: 0
PHOTOPHOBIA: 0
EYE PAIN: 0
RESPIRATORY NEGATIVE: 1
BACK PAIN: 0
SHORTNESS OF BREATH: 0
GASTROINTESTINAL NEGATIVE: 1
EYES NEGATIVE: 1
ALLERGIC/IMMUNOLOGIC NEGATIVE: 1

## 2023-11-17 ENCOUNTER — TELEPHONE (OUTPATIENT)
Age: 63
End: 2023-11-17

## 2023-11-22 ENCOUNTER — TELEPHONE (OUTPATIENT)
Age: 63
End: 2023-11-22

## 2023-11-22 NOTE — TELEPHONE ENCOUNTER
Regarding 11/17 telephone encounter, Elinor Fink with the [facility] called on behalf of the pt and asks to be called back at [phone].

## 2023-11-28 ENCOUNTER — TELEPHONE (OUTPATIENT)
Age: 63
End: 2023-11-28

## 2023-11-28 NOTE — TELEPHONE ENCOUNTER
Returned call to [facility] (as requested on telephone encounter 11/22/23). I spoke to Alka and she confirms, PA is required for injections. She confirms pt is \"good to [employer] as far as the financial portion of the procedure.

## 2023-12-07 ENCOUNTER — TELEPHONE (OUTPATIENT)
Age: 63
End: 2023-12-07

## 2023-12-07 NOTE — TELEPHONE ENCOUNTER
Patient was told by [facility] that they need a new referral prior to starting her treatment.

## 2023-12-08 ENCOUNTER — TELEPHONE (OUTPATIENT)
Age: 63
End: 2023-12-08

## 2023-12-08 NOTE — TELEPHONE ENCOUNTER
Sae from [facility] returned a call regarding opening back up referral for pt please call [phone]

## 2024-01-10 ENCOUNTER — TELEPHONE (OUTPATIENT)
Age: 64
End: 2024-01-10

## 2024-01-10 NOTE — TELEPHONE ENCOUNTER
Patient called to inform Dr Logan that her insurance is not in network with [facility]. Is there another facility she can use?

## 2024-01-11 NOTE — TELEPHONE ENCOUNTER
I spoke to pt and told her to check w/ Dr. Raman Payne's office to see if they take her insurance.

## 2024-01-29 ENCOUNTER — TELEPHONE (OUTPATIENT)
Age: 64
End: 2024-01-29

## 2024-01-29 NOTE — TELEPHONE ENCOUNTER
Received an incomplete leqvio order form from [facility] via fax. I completed missing info on order and faxed back to them on 1/26/24.

## 2024-04-23 ENCOUNTER — OFFICE VISIT (OUTPATIENT)
Age: 64
End: 2024-04-23
Payer: MEDICARE

## 2024-04-23 VITALS
DIASTOLIC BLOOD PRESSURE: 74 MMHG | HEIGHT: 64 IN | BODY MASS INDEX: 32.61 KG/M2 | SYSTOLIC BLOOD PRESSURE: 128 MMHG | WEIGHT: 191 LBS | HEART RATE: 90 BPM

## 2024-04-23 DIAGNOSIS — E78.2 MIXED HYPERLIPIDEMIA: ICD-10-CM

## 2024-04-23 DIAGNOSIS — I25.10 ATHEROSCLEROSIS OF NATIVE CORONARY ARTERY OF NATIVE HEART WITHOUT ANGINA PECTORIS: ICD-10-CM

## 2024-04-23 DIAGNOSIS — I10 PRIMARY HYPERTENSION: Primary | ICD-10-CM

## 2024-04-23 PROCEDURE — 3017F COLORECTAL CA SCREEN DOC REV: CPT | Performed by: INTERNAL MEDICINE

## 2024-04-23 PROCEDURE — 99214 OFFICE O/P EST MOD 30 MIN: CPT | Performed by: INTERNAL MEDICINE

## 2024-04-23 PROCEDURE — 93000 ELECTROCARDIOGRAM COMPLETE: CPT | Performed by: INTERNAL MEDICINE

## 2024-04-23 PROCEDURE — G8427 DOCREV CUR MEDS BY ELIG CLIN: HCPCS | Performed by: INTERNAL MEDICINE

## 2024-04-23 PROCEDURE — 1036F TOBACCO NON-USER: CPT | Performed by: INTERNAL MEDICINE

## 2024-04-23 PROCEDURE — 3074F SYST BP LT 130 MM HG: CPT | Performed by: INTERNAL MEDICINE

## 2024-04-23 PROCEDURE — 3078F DIAST BP <80 MM HG: CPT | Performed by: INTERNAL MEDICINE

## 2024-04-23 PROCEDURE — G8417 CALC BMI ABV UP PARAM F/U: HCPCS | Performed by: INTERNAL MEDICINE

## 2024-04-23 RX ORDER — METFORMIN HYDROCHLORIDE 500 MG/1
500 TABLET, EXTENDED RELEASE ORAL
COMMUNITY
Start: 2024-04-19

## 2024-04-23 RX ORDER — NITROGLYCERIN 0.4 MG/1
0.4 TABLET SUBLINGUAL EVERY 5 MIN PRN
Qty: 25 TABLET | Refills: 3 | Status: SHIPPED | OUTPATIENT
Start: 2024-04-23

## 2024-04-23 ASSESSMENT — ENCOUNTER SYMPTOMS
ABDOMINAL PAIN: 0
CHEST TIGHTNESS: 0
PHOTOPHOBIA: 0
ALLERGIC/IMMUNOLOGIC NEGATIVE: 1
SHORTNESS OF BREATH: 0
EYES NEGATIVE: 1
EYE PAIN: 0
BACK PAIN: 0
GASTROINTESTINAL NEGATIVE: 1

## 2024-04-23 NOTE — PROGRESS NOTES
[facility] CARDIOLOGY  [address] [place] [address], [address]  [place] [address]  PHONE: [phone]      24    NAME:  Destiny Chavez  : 1960  MRN: [id number]         SUBJECTIVE:   Destiny Chavez is a 63 y.o. female seen for follow up of:      Chief Complaint   Patient presents with    Hypertension        Cardiac Hx (Reviewed and summarized by me):  MARIAM Willoughbymaggie pt  1) HTN  2) CAD              Cath 2018 with PCI to mRCA 3.0 x 28 mm [facility] ([facility])              NM stress test 3/14/22 - normal perfusion  3) Echo              2018 LVEF 55-60% with normal dfun  4) Lipids              19 - HDL 33, LDL 66, Trig 100              19 - HDL 36, LDL 82, Trig 56              3/10/21 - HDL 34, LDL 86, Trig 88              22 - HDL 36, LDL 77, Trig 116              7/15/22 - HDL 38, LDL 98, Trig 107              1/10/23 - HDL 41, LDL 62, Trig 79              23 - HDL 34, LDL 81, Trig 86    24 - HDL 36, , Trig 107, Ratio 6.0    ECG: SR with negative precordial t waves (Independent review/interpretation by me)      HPI:  He was not able to afford Leqvio.  Had rashes with PSK9 inhibitors, muscle cramps with statins.  Last lipid panel was listed above in his.  Blood pressure is controlled she denies cardiac symptoms.    Past Medical History, Past Surgical History, Family history, Social History, and Medications were all reviewed with the patient today and updated as necessary.       Current Outpatient Medications:     metFORMIN (GLUCOPHAGE-XR) 500 MG extended release tablet, Take 1 tablet by mouth daily (with breakfast), Disp: , Rfl:     nitroGLYCERIN (NITROSTAT) 0.4 MG SL tablet, Place 1 tablet under the tongue every 5 minutes as needed for Chest pain, Disp: 25 tablet, Rfl: 3    aspirin 81 MG EC tablet, Take 1 tablet by mouth daily, Disp: , Rfl:     buPROPion (WELLBUTRIN XL) 300 MG extended release tablet, Take 1 tablet by mouth, Disp: , Rfl:     hydroCHLOROthiazide (HYDRODIURIL)

## 2024-08-22 NOTE — TELEPHONE ENCOUNTER
Requested Prescriptions     Pending Prescriptions Disp Refills    Evolocumab 140 MG/ML SOAJ 6 Adjustable Dose Pre-filled Pen Syringe 3     Sig: Inject 140 mg into the skin every 14 days

## 2024-10-24 ENCOUNTER — OFFICE VISIT (OUTPATIENT)
Age: 64
End: 2024-10-24
Payer: MEDICARE

## 2024-10-24 VITALS
BODY MASS INDEX: 33.8 KG/M2 | HEIGHT: 64 IN | WEIGHT: 198 LBS | DIASTOLIC BLOOD PRESSURE: 60 MMHG | SYSTOLIC BLOOD PRESSURE: 100 MMHG | HEART RATE: 80 BPM

## 2024-10-24 DIAGNOSIS — I25.10 ATHEROSCLEROSIS OF NATIVE CORONARY ARTERY OF NATIVE HEART WITHOUT ANGINA PECTORIS: Primary | ICD-10-CM

## 2024-10-24 DIAGNOSIS — I10 PRIMARY HYPERTENSION: ICD-10-CM

## 2024-10-24 DIAGNOSIS — R07.9 CHEST PAIN, UNSPECIFIED TYPE: ICD-10-CM

## 2024-10-24 DIAGNOSIS — E78.2 MIXED HYPERLIPIDEMIA: ICD-10-CM

## 2024-10-24 DIAGNOSIS — R07.2 PRECORDIAL PAIN: ICD-10-CM

## 2024-10-24 PROCEDURE — 99214 OFFICE O/P EST MOD 30 MIN: CPT | Performed by: INTERNAL MEDICINE

## 2024-10-24 PROCEDURE — 3074F SYST BP LT 130 MM HG: CPT | Performed by: INTERNAL MEDICINE

## 2024-10-24 PROCEDURE — 3078F DIAST BP <80 MM HG: CPT | Performed by: INTERNAL MEDICINE

## 2024-10-24 RX ORDER — NITROGLYCERIN 0.4 MG/1
0.4 TABLET SUBLINGUAL EVERY 5 MIN PRN
Qty: 25 TABLET | Refills: 3 | Status: SHIPPED | OUTPATIENT
Start: 2024-10-24

## 2024-10-24 ASSESSMENT — ENCOUNTER SYMPTOMS
GASTROINTESTINAL NEGATIVE: 1
CHEST TIGHTNESS: 0
RESPIRATORY NEGATIVE: 1
SHORTNESS OF BREATH: 0
ALLERGIC/IMMUNOLOGIC NEGATIVE: 1
BACK PAIN: 0
EYES NEGATIVE: 1
EYE PAIN: 0
ABDOMINAL PAIN: 0
PHOTOPHOBIA: 0

## 2024-10-24 NOTE — PROGRESS NOTES
leg: No edema.   Skin:     General: Skin is warm and dry.      Findings: No rash.   Neurological:      General: No focal deficit present.      Mental Status: She is oriented to person, place, and time.   Psychiatric:         Mood and Affect: Mood normal.         Behavior: Behavior normal.         Judgment: Judgment normal.          /60   Pulse 80   Ht 1.626 m (5' 4\")   Wt 89.8 kg (198 lb)   BMI 33.99 kg/m²      Wt Readings from Last 10 Encounters:   10/24/24 89.8 kg (198 lb)   04/23/24 86.6 kg (191 lb)   10/23/23 87.1 kg (192 lb)   04/18/23 87.9 kg (193 lb 12.8 oz)   10/18/22 85.4 kg (188 lb 3.2 oz)   04/04/22 86.8 kg (191 lb 6.4 oz)   03/14/22 86.6 kg (191 lb)   03/02/22 87 kg (191 lb 12.8 oz)   09/14/21 84.2 kg (185 lb 9.6 oz)   03/10/21 82.6 kg (182 lb)           Medical problems and test results were reviewed with the patient today.     Current Outpatient Medications   Medication Sig Dispense Refill    Evolocumab 140 MG/ML SOAJ Inject 140 mg into the skin every 14 days 6 Adjustable Dose Pre-filled Pen Syringe 3    nitroGLYCERIN (NITROSTAT) 0.4 MG SL tablet Place 1 tablet under the tongue every 5 minutes as needed for Chest pain 25 tablet 3    metFORMIN (GLUCOPHAGE-XR) 500 MG extended release tablet Take 1 tablet by mouth daily (with breakfast)      aspirin 81 MG EC tablet Take 1 tablet by mouth daily      buPROPion (WELLBUTRIN XL) 300 MG extended release tablet Take 1 tablet by mouth      hydroCHLOROthiazide (HYDRODIURIL) 25 MG tablet Take 1 tablet by mouth daily      sertraline (ZOLOFT) 100 MG tablet Take 2 tablets by mouth daily      traZODone (DESYREL) 100 MG tablet Take 1 tablet by mouth      valsartan (DIOVAN) 80 MG tablet Take 1 tablet by mouth daily       No current facility-administered medications for this visit.       No results found for: \"CHOL\", \"CHOLPOCT\", \"CHLST\", \"CHOLV\", \"HDL\", \"HDLPOC\", \"HDLC\", \"LDL\", \"LDLC\", \"VLDLC\", \"VLDL\"    ASSESSMENT and PLAN    ICD-10-CM    1. Atherosclerosis of

## 2025-04-28 ENCOUNTER — OFFICE VISIT (OUTPATIENT)
Age: 65
End: 2025-04-28
Payer: MEDICARE

## 2025-04-28 VITALS
BODY MASS INDEX: 33.63 KG/M2 | WEIGHT: 197 LBS | DIASTOLIC BLOOD PRESSURE: 76 MMHG | HEIGHT: 64 IN | SYSTOLIC BLOOD PRESSURE: 120 MMHG | HEART RATE: 71 BPM

## 2025-04-28 DIAGNOSIS — E78.2 MIXED HYPERLIPIDEMIA: ICD-10-CM

## 2025-04-28 DIAGNOSIS — I10 PRIMARY HYPERTENSION: ICD-10-CM

## 2025-04-28 DIAGNOSIS — I25.10 ATHEROSCLEROSIS OF NATIVE CORONARY ARTERY OF NATIVE HEART WITHOUT ANGINA PECTORIS: Primary | ICD-10-CM

## 2025-04-28 PROCEDURE — 3074F SYST BP LT 130 MM HG: CPT | Performed by: INTERNAL MEDICINE

## 2025-04-28 PROCEDURE — 3078F DIAST BP <80 MM HG: CPT | Performed by: INTERNAL MEDICINE

## 2025-04-28 PROCEDURE — 99214 OFFICE O/P EST MOD 30 MIN: CPT | Performed by: INTERNAL MEDICINE

## 2025-04-28 PROCEDURE — 93000 ELECTROCARDIOGRAM COMPLETE: CPT | Performed by: INTERNAL MEDICINE

## 2025-04-28 RX ORDER — NITROGLYCERIN 0.4 MG/1
0.4 TABLET SUBLINGUAL EVERY 5 MIN PRN
Qty: 25 TABLET | Refills: 3 | Status: SHIPPED | OUTPATIENT
Start: 2025-04-28

## 2025-04-28 ASSESSMENT — ENCOUNTER SYMPTOMS
ALLERGIC/IMMUNOLOGIC NEGATIVE: 1
PHOTOPHOBIA: 0
EYE PAIN: 0
GASTROINTESTINAL NEGATIVE: 1
EYES NEGATIVE: 1
ABDOMINAL PAIN: 0
CHEST TIGHTNESS: 0
BACK PAIN: 0
RESPIRATORY NEGATIVE: 1
SHORTNESS OF BREATH: 0

## 2025-04-28 NOTE — PROGRESS NOTES
[facility] CARDIOLOGY  [address] [place] [address], [address]  [place] [address]  PHONE: [phone]      25    NAME:  Destiny Chavez  : 1960  MRN: [id number]         SUBJECTIVE:   Destiny Chavez is a 64 y.o. female seen for follow up of:      Chief Complaint   Patient presents with    Hypertension    Coronary Artery Disease        Cardiac Hx (Reviewed and summarized by me):  MARIAM Contreras pt  1) HTN  2) CAD              Cath 2018 with PCI to mRCA 3.0 x 28 mm [facility] ([facility])              NM stress test 3/14/22 - normal perfusion   NM stress test 24 - normal perfusion  3) Echo              2018 LVEF 55-60% with normal dfun  4) Lipids              19 - HDL 33, LDL 66, Trig 100              19 - HDL 36, LDL 82, Trig 56              3/10/21 - HDL 34, LDL 86, Trig 88              22 - HDL 36, LDL 77, Trig 116              7/15/22 - HDL 38, LDL 98, Trig 107              1/10/23 - HDL 41, LDL 62, Trig 79              23 - HDL 34, LDL 81, Trig 86              24 - HDL 36, , Trig 107, Ratio 6.0       HPI:  Doing well.  At her last visit we checked a nuclear medicine stress test that did not show ischemia.  No recent lipid panel.  Blood pressure is well-controlled.    Past Medical History, Past Surgical History, Family history, Social History, and Medications were all reviewed with the patient today and updated as necessary.       Current Outpatient Medications:     Evolocumab 140 MG/ML SOAJ, Inject 140 mg into the skin every 14 days, Disp: 6 Adjustable Dose Pre-filled Pen Syringe, Rfl: 3    nitroGLYCERIN (NITROSTAT) 0.4 MG SL tablet, Place 1 tablet under the tongue every 5 minutes as needed for Chest pain, Disp: 25 tablet, Rfl: 3    metFORMIN (GLUCOPHAGE-XR) 500 MG extended release tablet, Take 1 tablet by mouth daily (with breakfast), Disp: , Rfl:     aspirin 81 MG EC tablet, Take 1 tablet by mouth daily, Disp: , Rfl:     buPROPion (WELLBUTRIN XL) 300 MG extended

## 2025-05-02 LAB
CHOLEST SERPL-MCNC: 149 MG/DL (ref 100–199)
HDLC SERPL-MCNC: 40 MG/DL
LDLC SERPL CALC-MCNC: 92 MG/DL (ref 0–99)
SPECIMEN STATUS REPORT: NORMAL
TRIGL SERPL-MCNC: 90 MG/DL (ref 0–149)
VLDLC SERPL CALC-MCNC: 17 MG/DL (ref 5–40)

## 2025-07-02 ENCOUNTER — PATIENT MESSAGE (OUTPATIENT)
Age: 65
End: 2025-07-02

## 2025-07-03 NOTE — TELEPHONE ENCOUNTER
I called the patient and informed her of Dr. Logan's advice.  Patient verbalized understanding.      Ferdinand Logan MD to Me (Selected Message)      7/3/25 11:37 AM  Just had a stress test in November, no signs of blocked heart arteries on that test. NO further work up is needed unless she has developed new concerning symptoms (since November).    Ferdinand Logan MD

## (undated) DEVICE — DRAPE SHT 3 QTR PROXIMA 53X77 --

## (undated) DEVICE — HANDPIECE SET WITH COAXIAL HIGH FLOW TIP AND SUCTION TUBE: Brand: INTERPULSE

## (undated) DEVICE — TRAY CATH 16F DRN BG LTX -- CONVERT TO ITEM 363158

## (undated) DEVICE — SOLUTION IV 1000ML 0.9% SOD CHL

## (undated) DEVICE — SUTURE STRATAFIX SPRL SZ 1 L14IN ABSRB VLT L48CM CTX 1/2 SXPD2B405

## (undated) DEVICE — MEDI-VAC YANKAUER SUCTION HANDLE W/BULBOUS TIP: Brand: CARDINAL HEALTH

## (undated) DEVICE — SLIM BODY SKIN STAPLER: Brand: APPOSE ULC

## (undated) DEVICE — SUTURE VCRL SZ 1 L27IN ABSRB UD L36MM CP-1 1/2 CIR REV CUT J268H

## (undated) DEVICE — SYR LR LCK 1ML GRAD NSAF 30ML --

## (undated) DEVICE — NEEDLE HYPO 18GA L1.5IN PNK S STL HUB POLYPR SHLD REG BVL

## (undated) DEVICE — PACK PROCEDURE SURG TOT KNEE

## (undated) DEVICE — GOWN,REINF,POLY,ECL,PP SLV,XL: Brand: MEDLINE

## (undated) DEVICE — DRAPE,U/SHT,SPLIT,FILM,60X84,STERILE: Brand: MEDLINE

## (undated) DEVICE — BLADE SAW W12.5XL70MM THK0.8MM CUT THK1.12MM S STL RECIP

## (undated) DEVICE — SOLUTION IV 500ML 0.9% SOD CHL FLX CONT

## (undated) DEVICE — DUAL CUT SAGITTAL BLADE

## (undated) DEVICE — X-RAY SPONGES,12 PLY: Brand: DERMACEA

## (undated) DEVICE — SYR 50ML LR LCK 1ML GRAD NSAF --

## (undated) DEVICE — T4 HOOD

## (undated) DEVICE — TRAY PREP DRY W/ PREM GLV 2 APPL 6 SPNG 2 UNDPD 1 OVERWRAP

## (undated) DEVICE — BUTTON SWITCH PENCIL BLADE ELECTRODE, HOLSTER: Brand: EDGE

## (undated) DEVICE — SUTURE VCRL SZ 2-0 L27IN ABSRB UD L36MM CP-1 1/2 CIR REV J266H

## (undated) DEVICE — MEDI-VAC NON-CONDUCTIVE SUCTION TUBING: Brand: CARDINAL HEALTH

## (undated) DEVICE — REM POLYHESIVE ADULT PATIENT RETURN ELECTRODE: Brand: VALLEYLAB

## (undated) DEVICE — 3M™ IOBAN™ 2 ANTIMICROBIAL INCISE DRAPE 6650EZ: Brand: IOBAN™ 2

## (undated) DEVICE — BIPOLAR SEALER 23-112-1 AQM 6.0: Brand: AQUAMANTYS ®

## (undated) DEVICE — BANDAGE COMPR SELF ADH 5 YDX4 IN TAN STRL PREMIERPRO LF

## (undated) DEVICE — DRAPE,TOP,102X53,STERILE: Brand: MEDLINE

## (undated) DEVICE — SOLUTION IRRIG 3000ML 0.9% SOD CHL FLX CONT 0797208] ICU MEDICAL INC]

## (undated) DEVICE — (D)PREP SKN CHLRAPRP APPL 26ML -- CONVERT TO ITEM 371833

## (undated) DEVICE — CURETTE BNE CEM 10IN DISP --

## (undated) DEVICE — 2000CC GUARDIAN II: Brand: GUARDIAN

## (undated) DEVICE — 3000CC GUARDIAN II: Brand: GUARDIAN

## (undated) DEVICE — Device: Brand: POWER-FLO®

## (undated) DEVICE — Z DISCONTINUED USE 2744636  DRESSING AQUACEL 14 IN ALG W3.5XL14IN POLYUR FLM CVR W/ HYDRCOLL